# Patient Record
Sex: FEMALE | Race: WHITE | NOT HISPANIC OR LATINO | URBAN - METROPOLITAN AREA
[De-identification: names, ages, dates, MRNs, and addresses within clinical notes are randomized per-mention and may not be internally consistent; named-entity substitution may affect disease eponyms.]

---

## 2019-10-29 ENCOUNTER — OUTPATIENT (OUTPATIENT)
Dept: OUTPATIENT SERVICES | Facility: HOSPITAL | Age: 79
LOS: 1 days | Discharge: HOME | End: 2019-10-29
Payer: MEDICARE

## 2019-10-29 VITALS
TEMPERATURE: 98 F | SYSTOLIC BLOOD PRESSURE: 138 MMHG | HEIGHT: 67 IN | WEIGHT: 156.75 LBS | DIASTOLIC BLOOD PRESSURE: 72 MMHG | OXYGEN SATURATION: 97 % | RESPIRATION RATE: 16 BRPM | HEART RATE: 85 BPM

## 2019-10-29 DIAGNOSIS — M16.12 UNILATERAL PRIMARY OSTEOARTHRITIS, LEFT HIP: ICD-10-CM

## 2019-10-29 DIAGNOSIS — E78.5 HYPERLIPIDEMIA, UNSPECIFIED: ICD-10-CM

## 2019-10-29 DIAGNOSIS — Z98.890 OTHER SPECIFIED POSTPROCEDURAL STATES: Chronic | ICD-10-CM

## 2019-10-29 DIAGNOSIS — Z01.818 ENCOUNTER FOR OTHER PREPROCEDURAL EXAMINATION: ICD-10-CM

## 2019-10-29 DIAGNOSIS — Z90.49 ACQUIRED ABSENCE OF OTHER SPECIFIED PARTS OF DIGESTIVE TRACT: Chronic | ICD-10-CM

## 2019-10-29 DIAGNOSIS — I10 ESSENTIAL (PRIMARY) HYPERTENSION: ICD-10-CM

## 2019-10-29 DIAGNOSIS — Z98.891 HISTORY OF UTERINE SCAR FROM PREVIOUS SURGERY: Chronic | ICD-10-CM

## 2019-10-29 LAB
ANION GAP SERPL CALC-SCNC: 5 MMOL/L — SIGNIFICANT CHANGE UP (ref 5–17)
APTT BLD: 27.2 SEC — LOW (ref 27.5–36.3)
BLD GP AB SCN SERPL QL: SIGNIFICANT CHANGE UP
BUN SERPL-MCNC: 16 MG/DL — SIGNIFICANT CHANGE UP (ref 7–23)
CALCIUM SERPL-MCNC: 8.9 MG/DL — SIGNIFICANT CHANGE UP (ref 8.5–10.1)
CHLORIDE SERPL-SCNC: 108 MMOL/L — SIGNIFICANT CHANGE UP (ref 96–108)
CO2 SERPL-SCNC: 28 MMOL/L — SIGNIFICANT CHANGE UP (ref 22–31)
CREAT SERPL-MCNC: 0.61 MG/DL — SIGNIFICANT CHANGE UP (ref 0.5–1.3)
GLUCOSE SERPL-MCNC: 137 MG/DL — HIGH (ref 70–99)
HBA1C BLD-MCNC: 6 % — HIGH (ref 4–5.6)
HCT VFR BLD CALC: 42.9 % — SIGNIFICANT CHANGE UP (ref 34.5–45)
HGB BLD-MCNC: 14 G/DL — SIGNIFICANT CHANGE UP (ref 11.5–15.5)
INR BLD: 0.97 RATIO — SIGNIFICANT CHANGE UP (ref 0.88–1.16)
MCHC RBC-ENTMCNC: 28.8 PG — SIGNIFICANT CHANGE UP (ref 27–34)
MCHC RBC-ENTMCNC: 32.6 GM/DL — SIGNIFICANT CHANGE UP (ref 32–36)
MCV RBC AUTO: 88.3 FL — SIGNIFICANT CHANGE UP (ref 80–100)
MRSA PCR RESULT.: SIGNIFICANT CHANGE UP
NRBC # BLD: 0 /100 WBCS — SIGNIFICANT CHANGE UP (ref 0–0)
PLATELET # BLD AUTO: 319 K/UL — SIGNIFICANT CHANGE UP (ref 150–400)
POTASSIUM SERPL-MCNC: 3.9 MMOL/L — SIGNIFICANT CHANGE UP (ref 3.5–5.3)
POTASSIUM SERPL-SCNC: 3.9 MMOL/L — SIGNIFICANT CHANGE UP (ref 3.5–5.3)
PROTHROM AB SERPL-ACNC: 10.9 SEC — SIGNIFICANT CHANGE UP (ref 10–12.9)
RBC # BLD: 4.86 M/UL — SIGNIFICANT CHANGE UP (ref 3.8–5.2)
RBC # FLD: 12.9 % — SIGNIFICANT CHANGE UP (ref 10.3–14.5)
S AUREUS DNA NOSE QL NAA+PROBE: DETECTED
SODIUM SERPL-SCNC: 141 MMOL/L — SIGNIFICANT CHANGE UP (ref 135–145)
WBC # BLD: 10.36 K/UL — SIGNIFICANT CHANGE UP (ref 3.8–10.5)
WBC # FLD AUTO: 10.36 K/UL — SIGNIFICANT CHANGE UP (ref 3.8–10.5)

## 2019-10-29 PROCEDURE — 93010 ELECTROCARDIOGRAM REPORT: CPT

## 2019-10-29 RX ORDER — ATORVASTATIN CALCIUM 80 MG/1
1 TABLET, FILM COATED ORAL
Qty: 0 | Refills: 0 | DISCHARGE

## 2019-10-29 RX ORDER — LOSARTAN POTASSIUM 100 MG/1
1 TABLET, FILM COATED ORAL
Qty: 0 | Refills: 0 | DISCHARGE

## 2019-10-29 RX ORDER — SODIUM CHLORIDE 9 MG/ML
3 INJECTION INTRAMUSCULAR; INTRAVENOUS; SUBCUTANEOUS EVERY 8 HOURS
Refills: 0 | Status: DISCONTINUED | OUTPATIENT
Start: 2019-11-11 | End: 2019-11-12

## 2019-10-29 NOTE — OCCUPATIONAL THERAPY INITIAL EVALUATION ADULT - RANGE OF MOTION EXAMINATION, LOWER EXTREMITY
ROM is limited in right hip due to pain./Left LE Active ROM was WNL  (within normal limits)/Right LE Passive ROM was WFL  (within functional limits)/Left LE Passive ROM was WNL (within normal limits)/Right LE Active Assistive ROM was WFL  (within functional limits)

## 2019-10-29 NOTE — OCCUPATIONAL THERAPY INITIAL EVALUATION ADULT - LIVES WITH, PROFILE
spouse/in a private house with 5 entry steps , equipped with close bilateral  rails. Once inside, pt has to negotiate a flight of stairs  with close, bilateral hand rails to access the bedroom and bathroom. The bathroom has a  walk in shower , fixed shower head and comfort height toilet. in a private house with 5 entry steps, equipped with close bilateral rails. Once inside, pt has to negotiate a flight of stairs with close, bilateral hand rails to access the bedroom and bathroom. The bathroom has a walk in shower, fixed shower head and comfort height toilet./spouse

## 2019-10-29 NOTE — H&P PST ADULT - NSICDXPASTSURGICALHX_GEN_ALL_CORE_FT
PAST SURGICAL HISTORY:  H/O bilateral breast reduction surgery     H/O hand surgery left    H/O laminectomy     History of      History of tonsillectomy and adenoidectomy     S/P appendectomy

## 2019-10-29 NOTE — OCCUPATIONAL THERAPY INITIAL EVALUATION ADULT - GENERAL OBSERVATIONS, REHAB EVAL
Chart reviewed. Patient encountered seated in chair in [facility] waiting area with NAD. Patient underwent occupational therapy pre-operative consultation to determine current functional ADL limitations in order to provide the right equipment for patient to perform functional ADL post operation.

## 2019-10-29 NOTE — OCCUPATIONAL THERAPY INITIAL EVALUATION ADULT - PERTINENT HX OF CURRENT PROBLEM, REHAB EVAL
Pt is slated for elective surgery for right THR at a later date with MD Hayward due to OA, chronic pain and DJD. Pt has a history of multiple comorbidities. Pt denied any falls in the past 3-6 months

## 2019-10-29 NOTE — H&P PST ADULT - HISTORY OF PRESENT ILLNESS
Looking forward to returning to gardening 79F pmh htn, hl c/o right hip pain2/2 unilateral primary osteoarthritis here for PST for scheduled Right total hip replacement  Looking forward to returning to gardening

## 2019-10-29 NOTE — H&P PST ADULT - NSICDXPROBLEM_GEN_ALL_CORE_FT
PROBLEM DIAGNOSES  Problem: Unilateral primary osteoarthritis, left hip  Assessment and Plan: Right total hip replacement  Pre-op instructions given by RN, patient verbalized understanding  Chlorhexidine wash instructions given   Ensure clear given  Pending: Medical Clearance    Problem: HLD (hyperlipidemia)  Assessment and Plan: Continue current regimen and medications.     Problem: HTN (hypertension)  Assessment and Plan: Continue current regimen and medications.

## 2019-10-29 NOTE — PHYSICAL THERAPY INITIAL EVALUATION ADULT - ADDITIONAL COMMENTS
no change in bowel habits/no vomiting/no diarrhea/no constipation There are 5 steps, c merry rails, close and able to be reached simultaneously,  at the entry of the house and one flight of steps, c merry rails, close and able to be reached simultaneously, to negotiate at home.  Pt has a walk in shower c fixed shower head and comfort toilet seat in BR. Pt is not sure if 3-1 commode will fit in BR. However, pt will use if as bedside commode. Pt is R handed and drives. R hip pain ranges from 0 to 8/10 and it is unpredictable and c sharp pain. Pt claimed no pain taoday. Pt takes Tylenol for pain but not helping. Spouse will be available to assist pt in post -op care upon discharge home.

## 2019-10-29 NOTE — H&P PST ADULT - ASSESSMENT
79F pmh htn, hl c/o right hip pain2/2 unilateral primary osteoarthritis here for PST for scheduled Right total hip replacement  CAPRINI SCORE    AGE RELATED RISK FACTORS                                                       MOBILITY RELATED FACTORS  [ ] Age 41-60 years                                            (1 Point)                  [ ] Bed rest                                                        (1 Point)  [ ] Age: 61-74 years                                           (2 Points)                [ ] Plaster cast                                                   (2 Points)  [x ] Age= 75 years                                              (3 Points)                 [ ] Bed bound for more than 72 hours                   (2 Points)    DISEASE RELATED RISK FACTORS                                               GENDER SPECIFIC FACTORS  [ x] Edema in the lower extremities                       (1 Point)                  [ ] Pregnancy                                                     (1 Point)  [ ] Varicose veins                                               (1 Point)                  [ ] Post-partum < 6 weeks                                   (1 Point)             [x ] BMI > 25 Kg/m2                                            (1 Point)                  [ ] Hormonal therapy  or oral contraception            (1 Point)                 [ ] Sepsis (in the previous month)                        (1 Point)                  [ ] History of pregnancy complications  [ ] Pneumonia or serious lung disease                                               [ ] Unexplained or recurrent                       (1 Point)           (in the previous month)                               (1 Point)  [ ] Abnormal pulmonary function test                     (1 Point)                 SURGERY RELATED RISK FACTORS  [ ] Acute myocardial infarction                              (1 Point)                 [ ]  Section                                            (1 Point)  [ ] Congestive heart failure (in the previous month)  (1 Point)                 [ ] Minor surgery                                                 (1 Point)   [ ] Inflammatory bowel disease                             (1 Point)                 [ ] Arthroscopic surgery                                        (2 Points)  [ ] Central venous access                                    (2 Points)                [ ] General surgery lasting more than 45 minutes   (2 Points)       [ ] Stroke (in the previous month)                          (5 Points)               [x ] Elective arthroplasty                                        (5 Points)                                                                                                                                               HEMATOLOGY RELATED FACTORS                                                 TRAUMA RELATED RISK FACTORS  [ ] Prior episodes of VTE                                     (3 Points)                 [ ] Fracture of the hip, pelvis, or leg                       (5 Points)  [ ] Positive family history for VTE                         (3 Points)                 [ ] Acute spinal cord injury (in the previous month)  (5 Points)  [ ] Prothrombin 98561 A                                      (3 Points)                 [ ] Paralysis  (less than 1 month)                          (5 Points)  [ ] Factor V Leiden                                             (3 Points)                 [ ] Multiple Trauma within 1 month                         (5 Points)  [ ] Lupus anticoagulants                                     (3 Points)                                                           [ ] Anticardiolipin antibodies                                (3 Points)                                                       [ ] High homocysteine in the blood                      (3 Points)                                             [ ] Other congenital or acquired thrombophilia       (3 Points)                                                [ ] Heparin induced thrombocytopenia                  (3 Points)                                          Total Score [  10        ]

## 2019-10-29 NOTE — H&P PST ADULT - NSANTHOSAYNRD_GEN_A_CORE
No. CARLENE screening performed.  STOP BANG Legend: 0-2 = LOW Risk; 3-4 = INTERMEDIATE Risk; 5-8 = HIGH Risk

## 2019-10-29 NOTE — OCCUPATIONAL THERAPY INITIAL EVALUATION ADULT - ADDITIONAL COMMENTS
Pt is functioning in her roles, self sufficient, driving & ambulating independently in the community without any assistive devices. Pt is right hand dominant and wears glasses for reading. Pt c/o 7/10 pain in her right hip ; this intensifies with changes in the weather, prolonged sitting, standing walking and it impacts ADL management. Pt takes Tylenol PRN for pain relief. Pt scores 90% of patient specific scale .

## 2019-10-29 NOTE — PHYSICAL THERAPY INITIAL EVALUATION ADULT - CRITERIA FOR SKILLED THERAPEUTIC INTERVENTIONS
therapy frequency/anticipated equipment needs at discharge/anticipated discharge recommendation/impairments found/functional limitations in following categories/risk reduction/prevention/rehab potential

## 2019-10-29 NOTE — OCCUPATIONAL THERAPY INITIAL EVALUATION ADULT - COORDINATION ASSESSED, REHAB EVAL
finger to nose/heel to shin/intact in BUE; diminished in BLE intact in BUE; diminished in BLE/finger to nose/heel to shin

## 2019-10-29 NOTE — OCCUPATIONAL THERAPY INITIAL EVALUATION ADULT - SOCIAL CONCERNS
Pt voiced concerns about her recovery at home. Pt has her spouse to assist her after discharge home post-operatively./Complex psychosocial needs/coping issues

## 2019-10-30 RX ORDER — MUPIROCIN 20 MG/G
1 OINTMENT TOPICAL
Qty: 1 | Refills: 0
Start: 2019-10-30

## 2019-11-06 DIAGNOSIS — M16.11 UNILATERAL PRIMARY OSTEOARTHRITIS, RIGHT HIP: ICD-10-CM

## 2019-11-10 ENCOUNTER — TRANSCRIPTION ENCOUNTER (OUTPATIENT)
Age: 79
End: 2019-11-10

## 2019-11-11 ENCOUNTER — TRANSCRIPTION ENCOUNTER (OUTPATIENT)
Age: 79
End: 2019-11-11

## 2019-11-11 ENCOUNTER — INPATIENT (INPATIENT)
Facility: HOSPITAL | Age: 79
LOS: 0 days | Discharge: NURSING, REHAB OR LONG-TERM CARE | End: 2019-11-12
Attending: ORTHOPAEDIC SURGERY | Admitting: ORTHOPAEDIC SURGERY
Payer: MEDICARE

## 2019-11-11 ENCOUNTER — RESULT REVIEW (OUTPATIENT)
Age: 79
End: 2019-11-11

## 2019-11-11 VITALS
WEIGHT: 156.53 LBS | HEART RATE: 91 BPM | RESPIRATION RATE: 13 BRPM | TEMPERATURE: 97 F | SYSTOLIC BLOOD PRESSURE: 139 MMHG | HEIGHT: 67 IN | OXYGEN SATURATION: 98 % | DIASTOLIC BLOOD PRESSURE: 64 MMHG

## 2019-11-11 DIAGNOSIS — Z98.890 OTHER SPECIFIED POSTPROCEDURAL STATES: Chronic | ICD-10-CM

## 2019-11-11 DIAGNOSIS — Z98.891 HISTORY OF UTERINE SCAR FROM PREVIOUS SURGERY: Chronic | ICD-10-CM

## 2019-11-11 DIAGNOSIS — Z90.49 ACQUIRED ABSENCE OF OTHER SPECIFIED PARTS OF DIGESTIVE TRACT: Chronic | ICD-10-CM

## 2019-11-11 LAB
BLD GP AB SCN SERPL QL: SIGNIFICANT CHANGE UP
GLUCOSE BLDC GLUCOMTR-MCNC: 157 MG/DL — HIGH (ref 70–99)
HCT VFR BLD CALC: 36.8 % — SIGNIFICANT CHANGE UP (ref 34.5–45)
HGB BLD-MCNC: 12 G/DL — SIGNIFICANT CHANGE UP (ref 11.5–15.5)
MCHC RBC-ENTMCNC: 29.1 PG — SIGNIFICANT CHANGE UP (ref 27–34)
MCHC RBC-ENTMCNC: 32.6 GM/DL — SIGNIFICANT CHANGE UP (ref 32–36)
MCV RBC AUTO: 89.3 FL — SIGNIFICANT CHANGE UP (ref 80–100)
NRBC # BLD: 0 /100 WBCS — SIGNIFICANT CHANGE UP (ref 0–0)
PLATELET # BLD AUTO: 295 K/UL — SIGNIFICANT CHANGE UP (ref 150–400)
RBC # BLD: 4.12 M/UL — SIGNIFICANT CHANGE UP (ref 3.8–5.2)
RBC # FLD: 12.8 % — SIGNIFICANT CHANGE UP (ref 10.3–14.5)
WBC # BLD: 12.91 K/UL — HIGH (ref 3.8–10.5)
WBC # FLD AUTO: 12.91 K/UL — HIGH (ref 3.8–10.5)

## 2019-11-11 PROCEDURE — 88311 DECALCIFY TISSUE: CPT | Mod: 26

## 2019-11-11 PROCEDURE — 72170 X-RAY EXAM OF PELVIS: CPT | Mod: 26

## 2019-11-11 PROCEDURE — 88305 TISSUE EXAM BY PATHOLOGIST: CPT | Mod: 26

## 2019-11-11 RX ORDER — ACETAMINOPHEN 500 MG
650 TABLET ORAL ONCE
Refills: 0 | Status: COMPLETED | OUTPATIENT
Start: 2019-11-11 | End: 2019-11-11

## 2019-11-11 RX ORDER — MORPHINE SULFATE 50 MG/1
4 CAPSULE, EXTENDED RELEASE ORAL ONCE
Refills: 0 | Status: DISCONTINUED | OUTPATIENT
Start: 2019-11-11 | End: 2019-11-11

## 2019-11-11 RX ORDER — ASPIRIN/CALCIUM CARB/MAGNESIUM 324 MG
325 TABLET ORAL
Refills: 0 | Status: DISCONTINUED | OUTPATIENT
Start: 2019-11-12 | End: 2019-11-12

## 2019-11-11 RX ORDER — CEFAZOLIN SODIUM 1 G
2000 VIAL (EA) INJECTION EVERY 8 HOURS
Refills: 0 | Status: COMPLETED | OUTPATIENT
Start: 2019-11-11 | End: 2019-11-12

## 2019-11-11 RX ORDER — DEXAMETHASONE 0.5 MG/5ML
10 ELIXIR ORAL ONCE
Refills: 0 | Status: COMPLETED | OUTPATIENT
Start: 2019-11-12 | End: 2019-11-12

## 2019-11-11 RX ORDER — CELECOXIB 200 MG/1
200 CAPSULE ORAL
Refills: 0 | Status: DISCONTINUED | OUTPATIENT
Start: 2019-11-12 | End: 2019-11-12

## 2019-11-11 RX ORDER — ONDANSETRON 8 MG/1
4 TABLET, FILM COATED ORAL EVERY 6 HOURS
Refills: 0 | Status: DISCONTINUED | OUTPATIENT
Start: 2019-11-11 | End: 2019-11-12

## 2019-11-11 RX ORDER — KETOROLAC TROMETHAMINE 30 MG/ML
30 SYRINGE (ML) INJECTION ONCE
Refills: 0 | Status: DISCONTINUED | OUTPATIENT
Start: 2019-11-11 | End: 2019-11-11

## 2019-11-11 RX ORDER — OXYCODONE HYDROCHLORIDE 5 MG/1
5 TABLET ORAL EVERY 4 HOURS
Refills: 0 | Status: DISCONTINUED | OUTPATIENT
Start: 2019-11-11 | End: 2019-11-12

## 2019-11-11 RX ORDER — CELECOXIB 200 MG/1
200 CAPSULE ORAL ONCE
Refills: 0 | Status: COMPLETED | OUTPATIENT
Start: 2019-11-11 | End: 2019-11-11

## 2019-11-11 RX ORDER — MAGNESIUM HYDROXIDE 400 MG/1
30 TABLET, CHEWABLE ORAL DAILY
Refills: 0 | Status: DISCONTINUED | OUTPATIENT
Start: 2019-11-11 | End: 2019-11-12

## 2019-11-11 RX ORDER — POLYETHYLENE GLYCOL 3350 17 G/17G
17 POWDER, FOR SOLUTION ORAL DAILY
Refills: 0 | Status: DISCONTINUED | OUTPATIENT
Start: 2019-11-11 | End: 2019-11-12

## 2019-11-11 RX ORDER — SODIUM CHLORIDE 9 MG/ML
1000 INJECTION, SOLUTION INTRAVENOUS
Refills: 0 | Status: DISCONTINUED | OUTPATIENT
Start: 2019-11-11 | End: 2019-11-12

## 2019-11-11 RX ORDER — OXYCODONE HYDROCHLORIDE 5 MG/1
10 TABLET ORAL EVERY 4 HOURS
Refills: 0 | Status: DISCONTINUED | OUTPATIENT
Start: 2019-11-11 | End: 2019-11-12

## 2019-11-11 RX ORDER — ASCORBIC ACID 60 MG
500 TABLET,CHEWABLE ORAL
Refills: 0 | Status: DISCONTINUED | OUTPATIENT
Start: 2019-11-11 | End: 2019-11-12

## 2019-11-11 RX ORDER — ACETAMINOPHEN 500 MG
650 TABLET ORAL ONCE
Refills: 0 | Status: DISCONTINUED | OUTPATIENT
Start: 2019-11-11 | End: 2019-11-12

## 2019-11-11 RX ORDER — ONDANSETRON 8 MG/1
4 TABLET, FILM COATED ORAL ONCE
Refills: 0 | Status: DISCONTINUED | OUTPATIENT
Start: 2019-11-11 | End: 2019-11-11

## 2019-11-11 RX ORDER — ATORVASTATIN CALCIUM 80 MG/1
20 TABLET, FILM COATED ORAL AT BEDTIME
Refills: 0 | Status: DISCONTINUED | OUTPATIENT
Start: 2019-11-11 | End: 2019-11-12

## 2019-11-11 RX ORDER — ACETAMINOPHEN 500 MG
650 TABLET ORAL EVERY 6 HOURS
Refills: 0 | Status: DISCONTINUED | OUTPATIENT
Start: 2019-11-11 | End: 2019-11-12

## 2019-11-11 RX ORDER — SODIUM CHLORIDE 9 MG/ML
1000 INJECTION, SOLUTION INTRAVENOUS
Refills: 0 | Status: DISCONTINUED | OUTPATIENT
Start: 2019-11-11 | End: 2019-11-11

## 2019-11-11 RX ORDER — HYDROMORPHONE HYDROCHLORIDE 2 MG/ML
0.5 INJECTION INTRAMUSCULAR; INTRAVENOUS; SUBCUTANEOUS
Refills: 0 | Status: DISCONTINUED | OUTPATIENT
Start: 2019-11-11 | End: 2019-11-11

## 2019-11-11 RX ORDER — CELECOXIB 200 MG/1
200 CAPSULE ORAL ONCE
Refills: 0 | Status: DISCONTINUED | OUTPATIENT
Start: 2019-11-11 | End: 2019-11-12

## 2019-11-11 RX ORDER — TRANEXAMIC ACID 100 MG/ML
1000 INJECTION, SOLUTION INTRAVENOUS ONCE
Refills: 0 | Status: COMPLETED | OUTPATIENT
Start: 2019-11-11 | End: 2019-11-11

## 2019-11-11 RX ORDER — SENNA PLUS 8.6 MG/1
2 TABLET ORAL AT BEDTIME
Refills: 0 | Status: DISCONTINUED | OUTPATIENT
Start: 2019-11-11 | End: 2019-11-12

## 2019-11-11 RX ORDER — LOSARTAN POTASSIUM 100 MG/1
100 TABLET, FILM COATED ORAL DAILY
Refills: 0 | Status: DISCONTINUED | OUTPATIENT
Start: 2019-11-11 | End: 2019-11-12

## 2019-11-11 RX ORDER — HYDROMORPHONE HYDROCHLORIDE 2 MG/ML
0.25 INJECTION INTRAMUSCULAR; INTRAVENOUS; SUBCUTANEOUS
Refills: 0 | Status: DISCONTINUED | OUTPATIENT
Start: 2019-11-11 | End: 2019-11-11

## 2019-11-11 RX ORDER — PANTOPRAZOLE SODIUM 20 MG/1
40 TABLET, DELAYED RELEASE ORAL
Refills: 0 | Status: DISCONTINUED | OUTPATIENT
Start: 2019-11-11 | End: 2019-11-12

## 2019-11-11 RX ADMIN — Medication 100 MILLIGRAM(S): at 20:36

## 2019-11-11 RX ADMIN — HYDROMORPHONE HYDROCHLORIDE 0.25 MILLIGRAM(S): 2 INJECTION INTRAMUSCULAR; INTRAVENOUS; SUBCUTANEOUS at 15:35

## 2019-11-11 RX ADMIN — SODIUM CHLORIDE 3 MILLILITER(S): 9 INJECTION INTRAMUSCULAR; INTRAVENOUS; SUBCUTANEOUS at 22:52

## 2019-11-11 RX ADMIN — HYDROMORPHONE HYDROCHLORIDE 0.5 MILLIGRAM(S): 2 INJECTION INTRAMUSCULAR; INTRAVENOUS; SUBCUTANEOUS at 15:20

## 2019-11-11 RX ADMIN — OXYCODONE HYDROCHLORIDE 10 MILLIGRAM(S): 5 TABLET ORAL at 19:45

## 2019-11-11 RX ADMIN — Medication 30 MILLIGRAM(S): at 17:49

## 2019-11-11 RX ADMIN — Medication 30 MILLIGRAM(S): at 17:14

## 2019-11-11 RX ADMIN — TRANEXAMIC ACID 1000 MILLIGRAM(S): 100 INJECTION, SOLUTION INTRAVENOUS at 14:57

## 2019-11-11 RX ADMIN — Medication 650 MILLIGRAM(S): at 17:50

## 2019-11-11 RX ADMIN — MORPHINE SULFATE 4 MILLIGRAM(S): 50 CAPSULE, EXTENDED RELEASE ORAL at 17:35

## 2019-11-11 RX ADMIN — SODIUM CHLORIDE 100 MILLILITER(S): 9 INJECTION, SOLUTION INTRAVENOUS at 22:52

## 2019-11-11 RX ADMIN — Medication 650 MILLIGRAM(S): at 18:50

## 2019-11-11 RX ADMIN — Medication 500 MILLIGRAM(S): at 17:50

## 2019-11-11 RX ADMIN — SODIUM CHLORIDE 110 MILLILITER(S): 9 INJECTION, SOLUTION INTRAVENOUS at 14:57

## 2019-11-11 RX ADMIN — OXYCODONE HYDROCHLORIDE 10 MILLIGRAM(S): 5 TABLET ORAL at 18:45

## 2019-11-11 RX ADMIN — MORPHINE SULFATE 4 MILLIGRAM(S): 50 CAPSULE, EXTENDED RELEASE ORAL at 17:10

## 2019-11-11 RX ADMIN — CELECOXIB 200 MILLIGRAM(S): 200 CAPSULE ORAL at 10:44

## 2019-11-11 RX ADMIN — HYDROMORPHONE HYDROCHLORIDE 0.5 MILLIGRAM(S): 2 INJECTION INTRAMUSCULAR; INTRAVENOUS; SUBCUTANEOUS at 14:54

## 2019-11-11 RX ADMIN — Medication 650 MILLIGRAM(S): at 10:44

## 2019-11-11 RX ADMIN — HYDROMORPHONE HYDROCHLORIDE 0.25 MILLIGRAM(S): 2 INJECTION INTRAMUSCULAR; INTRAVENOUS; SUBCUTANEOUS at 15:50

## 2019-11-11 RX ADMIN — ATORVASTATIN CALCIUM 20 MILLIGRAM(S): 80 TABLET, FILM COATED ORAL at 22:52

## 2019-11-11 NOTE — DISCHARGE NOTE PROVIDER - NSDCMRMEDTOKEN_GEN_ALL_CORE_FT
aspirin 81 mg oral tablet: 1 tab(s) orally once a day  atorvastatin: 20 milligram(s) orally once a day  losartan: 100 milligram(s) orally once a day  mupirocin 2% topical ointment: Apply topically to affected area 2 times a day MDD:2 nasally ascorbic acid 500 mg oral tablet: 1 tab(s) orally 2 times a day  aspirin 325 mg oral delayed release tablet: 1 tab(s) orally 2 times a day for DVT Prophylaxis   atorvastatin: 20 milligram(s) orally once a day  celecoxib 200 mg oral capsule: 1 cap(s) orally 2 times a day  losartan: 100 milligram(s) orally once a day  Multiple Vitamins oral tablet: 1 tab(s) orally once a day  oxyCODONE 10 mg oral tablet: 1 tab(s) orally every 4 hours, As needed, Moderate Pain (4 - 6)  oxyCODONE 5 mg oral tablet: 1 tab(s) orally every 4 hours, As needed, Mild Pain (1 - 3)  pantoprazole 40 mg oral delayed release tablet: 1 tab(s) orally once a day (before a meal)  senna oral tablet: 2 tab(s) orally once a day (at bedtime), As needed, Constipation

## 2019-11-11 NOTE — PHYSICAL THERAPY INITIAL EVALUATION ADULT - TRANSFER SAFETY CONCERNS NOTED: BED/CHAIR, REHAB EVAL
stepping too close to front of assistive device/decreased safety awareness/decreased sequencing ability/decreased balance during turns/decreased step length/losing balance/decreased weight-shifting ability

## 2019-11-11 NOTE — PHYSICAL THERAPY INITIAL EVALUATION ADULT - GAIT TRAINING, PT EVAL
Pt will be able to ambulate 350 feet using [RW] and maintaining WB precautions  independently in 2 to 3 weeks

## 2019-11-11 NOTE — PHYSICAL THERAPY INITIAL EVALUATION ADULT - TRANSFER TRAINING, PT EVAL
Pt will be able to perform sit to stand, stand pivot transfer using [RW] and maintaining WB precautions  independently in 2 weeks

## 2019-11-11 NOTE — PHYSICAL THERAPY INITIAL EVALUATION ADULT - ADDITIONAL COMMENTS
Verified post operatively,There are 5 steps, c merry rails, close and able to be reached simultaneously,  at the entry of the house and one flight of steps, c merry rails, close and able to be reached simultaneously, to negotiate at home.  Pt has a walk in shower c fixed shower head and comfort toilet seat in BR. Pt is not sure if 3-1 commode will fit in BR. However, pt will use if as bedside commode. Pt is R handed and drives. R hip pain ranges from 0 to 8/10 and it is unpredictable and c sharp pain. Pt claimed no pain today. Pt takes Tylenol for pain but not helping. Spouse will be available to assist pt in post -op care upon discharge home.

## 2019-11-11 NOTE — PHYSICAL THERAPY INITIAL EVALUATION ADULT - LEVEL OF INDEPENDENCE: BED TO CHAIR, REHAB EVAL
Psychiatry Outpatient Progress Note        History:     Patient report: Patient missed last appt, had unexpected pelvic surgery, fairly minor, removing endometrial polyps, but was not feeling well. Continues to have significant racing thoughts and mood instability, although mood overall and chronic SI are fairly improved with increased Zoloft, she is interested in trying trileptal as we have discussed previously.   Review of Systems: negative    Medications:  Current Outpatient Prescriptions   Medication Sig   • sertraline (ZOLOFT) 100 MG tablet Take 2 tablets by mouth daily.   • OXcarbazepine (TRILEPTAL) 150 MG tablet Take 1 tablet by mouth 2 times daily.   • clonazePAM (KLONOPIN) 1 MG tablet Take 0.5 tablets by mouth 3 times daily.   • zolpidem (AMBIEN) 10 MG tablet Take 1 tablet by mouth nightly as needed for Sleep. Fill on or after 6/14/18   • levothyroxine (SYNTHROID, LEVOTHROID) 137 MCG tablet Take 1 tablet by mouth Monday - Saturday.   • amLODIPine (NORVASC) 5 MG tablet Take 1 tablet by mouth daily.   • hydrochlorothiazide (HYDRODIURIL) 12.5 MG tablet Take 2 tablets by mouth daily.   • fluconazole (DIFLUCAN) 150 MG tablet Take one pill now and repeat in 3 days if needed   • lidocaine viscous (XYLOCAINE) 2 % solution Take 15 mLs by mouth every 3 hours as needed for Pain.   • lisinopril-hydroCHLOROthiazide (PRINZIDE,ZESTORETIC) 20-12.5 MG per tablet Take 2 tablets by mouth daily.   • ibuprofen (MOTRIN) 600 MG tablet Take 1 tablet by mouth every 6 hours as needed (cramping).   • Vitamin D, Ergocalciferol, 49730 units capsule Take 1 capsule by mouth once a week.   • fluticasone (FLONASE) 50 MCG/ACT nasal spray Spray 2 sprays in each nostril daily.     No current facility-administered medications for this visit.          Laboratory Tests or other studies:     none today    Mental Staus Exam: General Appearance: Well-nourished, Grooming, appropriate and neat, Attitude: Cooperative and Pleasant, Speech Rate: normal, 
Clarity, clear, Makes Self understood: Understood- can express ideas and wants, Ability to Understand Others: Understands, Volume: normal, Latency: normal, Mood:  \"ok\", Affect:  anxious and stable, Psychomotor: normal, Associations: intact, thought Process: linear, logical and goal directed, Thought content: unremarkable, Perceptual disorders/hallucinations: none reported or observed, Level of consciousness: alert, Orientation: oriented to person, place, time and general circumstances, Attention/Concentration: ability to maintain attention, Fund of knowledge: average, Memory: no apparent impairments in short term memory and no apparent impairments in long term memory, Insight:  fair, Judgment:  fair, Suicidal thoughts:  Denies, Gait/Station: Steady and well paced      Medical Decision Making    Diagnoses:    F33.1 Depression, major, recurrent, moderate (CMS/HCC)  (primary encounter diagnosis)  F60.3 Borderline personality disorder  F41.1 KWAME (generalized anxiety disorder)  F90.2 Attention deficit hyperactivity disorder (ADHD), combined type      Narrative assessment and plan:  Patient with some improvement. Continued racing thoughts, mood instability and chronic intermittent SI. She is restarting DBT next week which is a great idea. She is very sensitive to side effects and also highly somatically preoccupied. This makes it difficult trying new medications and she always has to think about it for a while. She will go ahead and try Trileptal for mood stabilization, beginning at a low dose. No other changes today.      Risk Assessment: no indication of acutely increased risk of harm to self or others above personal baseline, patient's baseline risk of harm to self is moderate and patient's baseline risk of harm to others is low    Follow up: 4 weeks                  
tea
minimum assist (75% patients effort)

## 2019-11-11 NOTE — OCCUPATIONAL THERAPY INITIAL EVALUATION ADULT - PLANNED THERAPY INTERVENTIONS, OT EVAL
ADL retraining/balance training/ROM/transfer training/strengthening/stretching/bed mobility training

## 2019-11-11 NOTE — OCCUPATIONAL THERAPY INITIAL EVALUATION ADULT - TRANSFER SAFETY CONCERNS NOTED: TOILET, REHAB EVAL
losing balance/decreased weight-shifting ability/decreased safety awareness/decreased balance during turns/decreased step length

## 2019-11-11 NOTE — PHYSICAL THERAPY INITIAL EVALUATION ADULT - STRENGTHENING, PT EVAL
Pt will improve muscle strength in all extremities to WFL in 1 to 2 weeks to perform Gait & ADL independently

## 2019-11-11 NOTE — PHYSICAL THERAPY INITIAL EVALUATION ADULT - TRANSFER SAFETY CONCERNS NOTED: SIT/STAND, REHAB EVAL
stepping too close to front of assistive device/decreased balance during turns/decreased safety awareness/decreased sequencing ability/decreased weight-shifting ability

## 2019-11-11 NOTE — OCCUPATIONAL THERAPY INITIAL EVALUATION ADULT - ADDITIONAL COMMENTS
Pre op assessment confirmed- pt lives with spouse in a private house with 5 entry steps, equipped with close bilateral rails. Once inside, pt has to negotiate a flight of stairs with close, bilateral hand rails to access the bedroom and bathroom. The bathroom has a walk in shower, fixed shower head and comfort height toilet. Pt has her spouse to assist her after discharge home post-operatively.

## 2019-11-11 NOTE — DISCHARGE NOTE PROVIDER - CARE PROVIDER_API CALL
Rocco Hayward)  Orthopaedic Surgery  [address] [place], [place] [address]  Phone: [phone]  Fax: [phone]  Follow Up Time:

## 2019-11-11 NOTE — CONSULT NOTE ADULT - SUBJECTIVE AND OBJECTIVE BOX
LILLIAN DENIS is a 79y Female s/p RIGHT TOTAL HIP REPLACEMENT  by Dr. Hayward on 19. complains of postop pain; patient tolerated surgery well.     PMH: w/ h/o HLD (hyperlipidemia)  HTN (hypertension)    ROS: no fevers, chills, headache, dizziness, lightheadedness, chest pain, palpitations, shortness of breath, cough, phlegm, wheezing, abdominal pain, nausea, vomiting, diarrhea, constipation or urinary symptoms     PSH:  H/O bilateral breast reduction surgery  H/O hand surgery  History of tonsillectomy and adenoidectomy  H/O laminectomy  S/P appendectomy  History of     SH: does not smoke or drink at this time    No Known Allergies    MEDS:  acetaminophen   Tablet .. 650 milliGRAM(s) Oral once  acetaminophen   Tablet .. 650 milliGRAM(s) Oral every 6 hours  aluminum hydroxide/magnesium hydroxide/simethicone Suspension 30 milliLiter(s) Oral four times a day PRN  ascorbic acid 500 milliGRAM(s) Oral two times a day  atorvastatin 20 milliGRAM(s) Oral at bedtime  ceFAZolin   IVPB 2000 milliGRAM(s) IV Intermittent every 8 hours  celecoxib 200 milliGRAM(s) Oral once  lactated ringers. 1000 milliLiter(s) IV Continuous <Continuous>  losartan 100 milliGRAM(s) Oral daily  magnesium hydroxide Suspension 30 milliLiter(s) Oral daily PRN  multivitamin 1 Tablet(s) Oral daily  ondansetron Injectable 4 milliGRAM(s) IV Push every 6 hours PRN  oxyCODONE    IR 5 milliGRAM(s) Oral every 4 hours PRN  oxyCODONE    IR 10 milliGRAM(s) Oral every 4 hours PRN  pantoprazole    Tablet 40 milliGRAM(s) Oral before breakfast  polyethylene glycol 3350 17 Gram(s) Oral daily  senna 2 Tablet(s) Oral at bedtime PRN  sodium chloride 0.9% lock flush 3 milliLiter(s) IV Push every 8 hours    PHYS: T(C): 37 (19 @ 18:44), Max: 37 (19 @ 18:44)  HR: 71 (19 @ 19:40) (62 - 99)  BP: 146/57 (19 @ 19:40) (116/73 - 148/83)  RR: 17 (19 @ 19:40) (13 - 21)  SpO2: 95% (19 @ 19:40) (94% - 100%)  HEENT unremarkable  neck no JVD or bruit  lungs, clear bilaterally  heart, regular rhythm, normal S1, S2, no murmurs, rubs or gallops  abdomen, soft, non tender, no organomegaly, normal bowel sounds  no cyanosis, clubbing, edema or calf tenderness  neuro, grossly normal                        12.0   12.91 )-----------( 295      ( 2019 15:36 )             36.8     Assessment and Plan: status post right total hip replacement; Hypertension; hyperlipidemia; postop leucocytosis; pre diabetes mellitus (random elevated glucose and A1c=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

## 2019-11-11 NOTE — DISCHARGE NOTE PROVIDER - NSDCFUADDINST_GEN_ALL_CORE_FT
Keep STACIE Dressing Clean, Dry and Intact. May shower with STACIE Dressing. Please do not scrub, soak, peel or pick at the STACIE dressing. No creams, lotions, or oils over dressing. May shower and let water run over dressing, no baths. Pat dry once out of shower. Dressing to be removed in 7 days. If dressing is saturated from border to border - may remove and replace with clean dry dressing.    Shower instructions for STACIE Dressing: Turn battery pack off. Twist OFF battery pack before entering shower. Once done with showering. Pat dressing dry. Reconnect and twist ON battery pack after you are dry. Then turn battery pack on.    Hip replacement precautions: Abduction pillow. Elevate the leg (while keeping hip precautions) as often as possible to help control swelling.

## 2019-11-11 NOTE — DISCHARGE NOTE PROVIDER - NSDCACTIVITY_GEN_ALL_CORE
Walking - Indoors allowed/No heavy lifting/straining/Showering allowed/Do not drive or operate machinery/Do not make important decisions/Stairs allowed/Walking - Outdoors allowed

## 2019-11-11 NOTE — DISCHARGE NOTE PROVIDER - NSDCFUADDAPPT_GEN_ALL_CORE_FT
Follow up with your surgeon in two weeks.  Call for appointment.  If you need more pain medication, call your surgeon's office.  We recommend that you call and schedule a follow up appointment with your primary care physician for repeat blood work (CBC and BMP) for post hospital discharge follow-up care.  Call your surgeon if you have increased redness/pain/drainage or fever. Return to ER for shortness of breath/calf tenderness.

## 2019-11-11 NOTE — PHYSICAL THERAPY INITIAL EVALUATION ADULT - BALANCE TRAINING, PT EVAL
Pt will improve static & dynamic standing balance to Good using [Rolling walker] maintaining WB precaution  to perform ADL, Gait independently  in 2 to 3 weeks

## 2019-11-11 NOTE — OCCUPATIONAL THERAPY INITIAL EVALUATION ADULT - TRANSFER SAFETY CONCERNS NOTED: SIT/STAND, REHAB EVAL
decreased safety awareness/decreased balance during turns/decreased step length/losing balance/decreased weight-shifting ability

## 2019-11-11 NOTE — PROGRESS NOTE ADULT - SUBJECTIVE AND OBJECTIVE BOX
79yFemale s/p L Total Hip Arthroplasty pod 0.  Pt seen and examined in NAD.  Pain currently not controlled.  Pt denies any new complaints.  Pt denies CP/SOB/N/V/D/numbness/tingling.     PE:     LLE: STACIE dressing c/d/i. +ROM ankle/toes. Calf: soft, compressible and nontender. DP/PT 2+ NVI.                           12.0   12.91 )-----------( 295      ( 11 Nov 2019 15:36 )             36.8             A/P: 79yFemale s/p L LINA pod 0.  +/- Total hip precautions  PT: WBAT   DVT ppx: SCDs, ASA, ambulation  Wound care  Isometric exercises encouraged  incentive spirometry encouraged  Discharge: planning   All the above discussed and understood by pt

## 2019-11-11 NOTE — PHYSICAL THERAPY INITIAL EVALUATION ADULT - CRITERIA FOR SKILLED THERAPEUTIC INTERVENTIONS
therapy frequency/anticipated discharge recommendation/subacute rehab, pt has DME/anticipated equipment needs at discharge/impairments found/functional limitations in following categories/risk reduction/prevention/rehab potential/predicted duration of therapy intervention

## 2019-11-11 NOTE — OCCUPATIONAL THERAPY INITIAL EVALUATION ADULT - BALANCE TRAINING, PT EVAL
English Pt will increase dynamic standing balance to normal to increase performance with ADLs in 2 weeks

## 2019-11-11 NOTE — DISCHARGE NOTE PROVIDER - HOSPITAL COURSE
79yFemale with history of OA presenting for L LINA by [facility] on 11/11/19.  Risk and benefits of surgery were explained to the patient.  The patient understood and agreed to proceed with surgery.  Patient underwent the procedure with no intraoperative complications.  Pt was brought in stable condition to the PACU.  Once stable in PACU, pt was brought to the floor.  During hospital stay pt was followed by Medicine, social work, home care, PT and OT during this admission. Pt had an uneventful hospital course. Pt is stable for discharge to [rehab/home] on POD#1. 79yFemale with history of OA presenting for L LINA by [facility] on 11/11/19.  Risk and benefits of surgery were explained to the patient.  The patient understood and agreed to proceed with surgery.  Patient underwent the procedure with no intraoperative complications.  Pt was brought in stable condition to the PACU.  Once stable in PACU, pt was brought to the floor.  During hospital stay pt was followed by Medicine, social work, home care, PT and OT during this admission. Pt had an uneventful hospital course. Pt is stable for discharge to Rehab

## 2019-11-12 ENCOUNTER — TRANSCRIPTION ENCOUNTER (OUTPATIENT)
Age: 79
End: 2019-11-12

## 2019-11-12 VITALS
SYSTOLIC BLOOD PRESSURE: 130 MMHG | DIASTOLIC BLOOD PRESSURE: 59 MMHG | HEART RATE: 84 BPM | OXYGEN SATURATION: 94 % | RESPIRATION RATE: 16 BRPM

## 2019-11-12 LAB
ANION GAP SERPL CALC-SCNC: 5 MMOL/L — SIGNIFICANT CHANGE UP (ref 5–17)
BUN SERPL-MCNC: 17 MG/DL — SIGNIFICANT CHANGE UP (ref 7–23)
CALCIUM SERPL-MCNC: 7.9 MG/DL — LOW (ref 8.5–10.1)
CHLORIDE SERPL-SCNC: 103 MMOL/L — SIGNIFICANT CHANGE UP (ref 96–108)
CO2 SERPL-SCNC: 28 MMOL/L — SIGNIFICANT CHANGE UP (ref 22–31)
CREAT SERPL-MCNC: 0.74 MG/DL — SIGNIFICANT CHANGE UP (ref 0.5–1.3)
GLUCOSE SERPL-MCNC: 118 MG/DL — HIGH (ref 70–99)
HCT VFR BLD CALC: 32.5 % — LOW (ref 34.5–45)
HGB BLD-MCNC: 10.8 G/DL — LOW (ref 11.5–15.5)
MCHC RBC-ENTMCNC: 29.8 PG — SIGNIFICANT CHANGE UP (ref 27–34)
MCHC RBC-ENTMCNC: 33.2 GM/DL — SIGNIFICANT CHANGE UP (ref 32–36)
MCV RBC AUTO: 89.5 FL — SIGNIFICANT CHANGE UP (ref 80–100)
NRBC # BLD: 0 /100 WBCS — SIGNIFICANT CHANGE UP (ref 0–0)
PLATELET # BLD AUTO: 255 K/UL — SIGNIFICANT CHANGE UP (ref 150–400)
POTASSIUM SERPL-MCNC: 4.3 MMOL/L — SIGNIFICANT CHANGE UP (ref 3.5–5.3)
POTASSIUM SERPL-SCNC: 4.3 MMOL/L — SIGNIFICANT CHANGE UP (ref 3.5–5.3)
RBC # BLD: 3.63 M/UL — LOW (ref 3.8–5.2)
RBC # FLD: 12.8 % — SIGNIFICANT CHANGE UP (ref 10.3–14.5)
SODIUM SERPL-SCNC: 136 MMOL/L — SIGNIFICANT CHANGE UP (ref 135–145)
WBC # BLD: 14.87 K/UL — HIGH (ref 3.8–10.5)
WBC # FLD AUTO: 14.87 K/UL — HIGH (ref 3.8–10.5)

## 2019-11-12 RX ORDER — ASPIRIN/CALCIUM CARB/MAGNESIUM 324 MG
1 TABLET ORAL
Qty: 0 | Refills: 0 | DISCHARGE
Start: 2019-11-12

## 2019-11-12 RX ORDER — MORPHINE SULFATE 50 MG/1
2 CAPSULE, EXTENDED RELEASE ORAL ONCE
Refills: 0 | Status: DISCONTINUED | OUTPATIENT
Start: 2019-11-12 | End: 2019-11-12

## 2019-11-12 RX ORDER — CELECOXIB 200 MG/1
1 CAPSULE ORAL
Qty: 0 | Refills: 0 | DISCHARGE
Start: 2019-11-12

## 2019-11-12 RX ORDER — OXYCODONE HYDROCHLORIDE 5 MG/1
1 TABLET ORAL
Qty: 0 | Refills: 0 | DISCHARGE
Start: 2019-11-12

## 2019-11-12 RX ORDER — ASCORBIC ACID 60 MG
1 TABLET,CHEWABLE ORAL
Qty: 0 | Refills: 0 | DISCHARGE
Start: 2019-11-12

## 2019-11-12 RX ORDER — SENNA PLUS 8.6 MG/1
2 TABLET ORAL
Qty: 0 | Refills: 0 | DISCHARGE
Start: 2019-11-12

## 2019-11-12 RX ORDER — ASPIRIN/CALCIUM CARB/MAGNESIUM 324 MG
1 TABLET ORAL
Qty: 0 | Refills: 0 | DISCHARGE

## 2019-11-12 RX ORDER — PANTOPRAZOLE SODIUM 20 MG/1
1 TABLET, DELAYED RELEASE ORAL
Qty: 0 | Refills: 0 | DISCHARGE
Start: 2019-11-12

## 2019-11-12 RX ADMIN — Medication 650 MILLIGRAM(S): at 06:00

## 2019-11-12 RX ADMIN — Medication 650 MILLIGRAM(S): at 13:18

## 2019-11-12 RX ADMIN — CELECOXIB 200 MILLIGRAM(S): 200 CAPSULE ORAL at 18:27

## 2019-11-12 RX ADMIN — PANTOPRAZOLE SODIUM 40 MILLIGRAM(S): 20 TABLET, DELAYED RELEASE ORAL at 07:59

## 2019-11-12 RX ADMIN — Medication 325 MILLIGRAM(S): at 05:03

## 2019-11-12 RX ADMIN — Medication 650 MILLIGRAM(S): at 00:09

## 2019-11-12 RX ADMIN — OXYCODONE HYDROCHLORIDE 10 MILLIGRAM(S): 5 TABLET ORAL at 05:02

## 2019-11-12 RX ADMIN — CELECOXIB 200 MILLIGRAM(S): 200 CAPSULE ORAL at 06:00

## 2019-11-12 RX ADMIN — OXYCODONE HYDROCHLORIDE 10 MILLIGRAM(S): 5 TABLET ORAL at 01:05

## 2019-11-12 RX ADMIN — Medication 650 MILLIGRAM(S): at 18:55

## 2019-11-12 RX ADMIN — Medication 650 MILLIGRAM(S): at 05:02

## 2019-11-12 RX ADMIN — MORPHINE SULFATE 2 MILLIGRAM(S): 50 CAPSULE, EXTENDED RELEASE ORAL at 06:50

## 2019-11-12 RX ADMIN — POLYETHYLENE GLYCOL 3350 17 GRAM(S): 17 POWDER, FOR SOLUTION ORAL at 12:18

## 2019-11-12 RX ADMIN — OXYCODONE HYDROCHLORIDE 10 MILLIGRAM(S): 5 TABLET ORAL at 06:00

## 2019-11-12 RX ADMIN — Medication 500 MILLIGRAM(S): at 05:03

## 2019-11-12 RX ADMIN — OXYCODONE HYDROCHLORIDE 10 MILLIGRAM(S): 5 TABLET ORAL at 16:19

## 2019-11-12 RX ADMIN — OXYCODONE HYDROCHLORIDE 10 MILLIGRAM(S): 5 TABLET ORAL at 00:09

## 2019-11-12 RX ADMIN — Medication 650 MILLIGRAM(S): at 01:05

## 2019-11-12 RX ADMIN — Medication 1 TABLET(S): at 12:18

## 2019-11-12 RX ADMIN — Medication 650 MILLIGRAM(S): at 18:27

## 2019-11-12 RX ADMIN — Medication 102 MILLIGRAM(S): at 06:27

## 2019-11-12 RX ADMIN — SODIUM CHLORIDE 3 MILLILITER(S): 9 INJECTION INTRAMUSCULAR; INTRAVENOUS; SUBCUTANEOUS at 05:01

## 2019-11-12 RX ADMIN — Medication 650 MILLIGRAM(S): at 12:18

## 2019-11-12 RX ADMIN — Medication 100 MILLIGRAM(S): at 04:53

## 2019-11-12 RX ADMIN — Medication 500 MILLIGRAM(S): at 18:27

## 2019-11-12 RX ADMIN — CELECOXIB 200 MILLIGRAM(S): 200 CAPSULE ORAL at 05:03

## 2019-11-12 RX ADMIN — SODIUM CHLORIDE 3 MILLILITER(S): 9 INJECTION INTRAMUSCULAR; INTRAVENOUS; SUBCUTANEOUS at 14:11

## 2019-11-12 RX ADMIN — MORPHINE SULFATE 2 MILLIGRAM(S): 50 CAPSULE, EXTENDED RELEASE ORAL at 07:20

## 2019-11-12 RX ADMIN — LOSARTAN POTASSIUM 100 MILLIGRAM(S): 100 TABLET, FILM COATED ORAL at 05:03

## 2019-11-12 RX ADMIN — Medication 325 MILLIGRAM(S): at 18:27

## 2019-11-12 RX ADMIN — CELECOXIB 200 MILLIGRAM(S): 200 CAPSULE ORAL at 18:55

## 2019-11-12 RX ADMIN — OXYCODONE HYDROCHLORIDE 10 MILLIGRAM(S): 5 TABLET ORAL at 17:15

## 2019-11-12 NOTE — PROGRESS NOTE ADULT - SUBJECTIVE AND OBJECTIVE BOX
POD#1 S/P Right LINA   79yFemale Patient seen and examined with Dr. Hayward, Pain controlled  Patient Denies SOB, CP, N/V/D       PE: Right Hip/LE: Dressing C/D/I, Sensation/motor intact, DP 2+, FROM ankle/toes   B/L LE: Skin intact. +ROM hip/knee/ankle/toes. Ankle Dorsi/plantarflexion: 5/5. Calf: soft, compressible and nontender. DP/PT 2+ NVI                          10.8   14.87 )-----------( 255      ( 12 Nov 2019 06:31 )             32.5       11-12    136  |  103  |  17  ----------------------------<  118<H>  4.3   |  28  |  0.74    Ca    7.9<L>      12 Nov 2019 06:32          A: As above   P: Pain Control       DVT Prophylaxis      Incentive spirometry      Total hip precautions reviewed       PT WBAT RLE      Isometric exercises      Discharge Planning      All the above discussed and understood by pt       Ortho to F/U

## 2019-11-12 NOTE — DISCHARGE NOTE NURSING/CASE MANAGEMENT/SOCIAL WORK - PATIENT PORTAL LINK FT
You can access the FollowMyHealth Patient Portal offered by [facility] by registering at the following website: [email or web address]. By joining [employer]’s FollowMyHealth portal, you will also be able to view your health information using other applications (apps) compatible with our system.

## 2019-11-12 NOTE — PROGRESS NOTE ADULT - SUBJECTIVE AND OBJECTIVE BOX
LILLIAN DENIS is a 79y Female s/p RIGHT TOTAL HIP REPLACEMENT      denies any chest pain shortness of breath palpitation dizziness lightheadedness nausea vomiting fever or chills    T(C): 37 (11-12-19 @ 07:40), Max: 37.2 (11-11-19 @ 23:40)  HR: 90 (11-12-19 @ 07:40) (62 - 99)  BP: 120/62 (11-12-19 @ 07:40) (116/73 - 148/83)  RR: 16 (11-12-19 @ 07:40) (13 - 21)  SpO2: 96% (11-12-19 @ 07:40) (94% - 100%)  no jvd/bruit  s1 s2 rrr  cta  s/nt/nd  no calf tend                          10.8   14.87 )-----------( 255      ( 12 Nov 2019 06:31 )             32.5   11-12    136  |  103  |  17  ----------------------------<  118<H>  4.3   |  28  |  0.74    Ca    7.9<L>      12 Nov 2019 06:32    ^wbc post fu op  cont dvt px  pain control  bowel regimen  antiemetics  incentive spirometer

## 2019-11-13 LAB — SURGICAL PATHOLOGY STUDY: SIGNIFICANT CHANGE UP

## 2019-11-14 DIAGNOSIS — R73.03 PREDIABETES: ICD-10-CM

## 2019-11-14 DIAGNOSIS — M16.11 UNILATERAL PRIMARY OSTEOARTHRITIS, RIGHT HIP: ICD-10-CM

## 2019-11-14 DIAGNOSIS — E78.5 HYPERLIPIDEMIA, UNSPECIFIED: ICD-10-CM

## 2019-11-14 DIAGNOSIS — I10 ESSENTIAL (PRIMARY) HYPERTENSION: ICD-10-CM

## 2020-03-04 PROBLEM — I10 ESSENTIAL (PRIMARY) HYPERTENSION: Chronic | Status: ACTIVE | Noted: 2019-10-29

## 2020-05-11 ENCOUNTER — RESULT REVIEW (OUTPATIENT)
Age: 80
End: 2020-05-11

## 2020-05-22 ENCOUNTER — OUTPATIENT (OUTPATIENT)
Dept: OUTPATIENT SERVICES | Facility: HOSPITAL | Age: 80
LOS: 1 days | Discharge: HOME | End: 2020-05-22
Payer: MEDICARE

## 2020-05-22 VITALS
HEIGHT: 67 IN | TEMPERATURE: 98 F | SYSTOLIC BLOOD PRESSURE: 142 MMHG | RESPIRATION RATE: 18 BRPM | WEIGHT: 167.33 LBS | DIASTOLIC BLOOD PRESSURE: 60 MMHG | HEART RATE: 73 BPM | OXYGEN SATURATION: 97 %

## 2020-05-22 DIAGNOSIS — Z98.890 OTHER SPECIFIED POSTPROCEDURAL STATES: Chronic | ICD-10-CM

## 2020-05-22 DIAGNOSIS — M16.12 UNILATERAL PRIMARY OSTEOARTHRITIS, LEFT HIP: ICD-10-CM

## 2020-05-22 DIAGNOSIS — Z90.49 ACQUIRED ABSENCE OF OTHER SPECIFIED PARTS OF DIGESTIVE TRACT: Chronic | ICD-10-CM

## 2020-05-22 DIAGNOSIS — Z98.891 HISTORY OF UTERINE SCAR FROM PREVIOUS SURGERY: Chronic | ICD-10-CM

## 2020-05-22 DIAGNOSIS — I10 ESSENTIAL (PRIMARY) HYPERTENSION: ICD-10-CM

## 2020-05-22 DIAGNOSIS — Z96.641 PRESENCE OF RIGHT ARTIFICIAL HIP JOINT: Chronic | ICD-10-CM

## 2020-05-22 DIAGNOSIS — E78.5 HYPERLIPIDEMIA, UNSPECIFIED: ICD-10-CM

## 2020-05-22 DIAGNOSIS — Z01.818 ENCOUNTER FOR OTHER PREPROCEDURAL EXAMINATION: ICD-10-CM

## 2020-05-22 LAB
A1C WITH ESTIMATED AVERAGE GLUCOSE RESULT: 6 % — HIGH (ref 4–5.6)
ANION GAP SERPL CALC-SCNC: 1 MMOL/L — LOW (ref 5–17)
BASOPHILS # BLD AUTO: 0.07 K/UL — SIGNIFICANT CHANGE UP (ref 0–0.2)
BASOPHILS NFR BLD AUTO: 0.8 % — SIGNIFICANT CHANGE UP (ref 0–2)
BLD GP AB SCN SERPL QL: SIGNIFICANT CHANGE UP
BUN SERPL-MCNC: 22 MG/DL — SIGNIFICANT CHANGE UP (ref 7–23)
CALCIUM SERPL-MCNC: 8.6 MG/DL — SIGNIFICANT CHANGE UP (ref 8.5–10.1)
CHLORIDE SERPL-SCNC: 112 MMOL/L — HIGH (ref 96–108)
CO2 SERPL-SCNC: 31 MMOL/L — SIGNIFICANT CHANGE UP (ref 22–31)
CREAT SERPL-MCNC: 0.63 MG/DL — SIGNIFICANT CHANGE UP (ref 0.5–1.3)
EOSINOPHIL # BLD AUTO: 0.26 K/UL — SIGNIFICANT CHANGE UP (ref 0–0.5)
EOSINOPHIL NFR BLD AUTO: 3.1 % — SIGNIFICANT CHANGE UP (ref 0–6)
ESTIMATED AVERAGE GLUCOSE: 126 MG/DL — HIGH (ref 68–114)
GLUCOSE SERPL-MCNC: 120 MG/DL — HIGH (ref 70–99)
HCT VFR BLD CALC: 40.1 % — SIGNIFICANT CHANGE UP (ref 34.5–45)
HGB BLD-MCNC: 13.4 G/DL — SIGNIFICANT CHANGE UP (ref 11.5–15.5)
IMM GRANULOCYTES NFR BLD AUTO: 0.2 % — SIGNIFICANT CHANGE UP (ref 0–1.5)
INR BLD: 0.99 RATIO — SIGNIFICANT CHANGE UP (ref 0.88–1.16)
LYMPHOCYTES # BLD AUTO: 2.61 K/UL — SIGNIFICANT CHANGE UP (ref 1–3.3)
LYMPHOCYTES # BLD AUTO: 30.9 % — SIGNIFICANT CHANGE UP (ref 13–44)
MCHC RBC-ENTMCNC: 28.7 PG — SIGNIFICANT CHANGE UP (ref 27–34)
MCHC RBC-ENTMCNC: 33.4 GM/DL — SIGNIFICANT CHANGE UP (ref 32–36)
MCV RBC AUTO: 85.9 FL — SIGNIFICANT CHANGE UP (ref 80–100)
MONOCYTES # BLD AUTO: 0.68 K/UL — SIGNIFICANT CHANGE UP (ref 0–0.9)
MONOCYTES NFR BLD AUTO: 8 % — SIGNIFICANT CHANGE UP (ref 2–14)
MRSA PCR RESULT.: SIGNIFICANT CHANGE UP
NEUTROPHILS # BLD AUTO: 4.81 K/UL — SIGNIFICANT CHANGE UP (ref 1.8–7.4)
NEUTROPHILS NFR BLD AUTO: 57 % — SIGNIFICANT CHANGE UP (ref 43–77)
NRBC # BLD: 0 /100 WBCS — SIGNIFICANT CHANGE UP (ref 0–0)
PLATELET # BLD AUTO: 300 K/UL — SIGNIFICANT CHANGE UP (ref 150–400)
POTASSIUM SERPL-MCNC: 4.4 MMOL/L — SIGNIFICANT CHANGE UP (ref 3.5–5.3)
POTASSIUM SERPL-SCNC: 4.4 MMOL/L — SIGNIFICANT CHANGE UP (ref 3.5–5.3)
PROTHROM AB SERPL-ACNC: 11 SEC — SIGNIFICANT CHANGE UP (ref 10–12.9)
RBC # BLD: 4.67 M/UL — SIGNIFICANT CHANGE UP (ref 3.8–5.2)
RBC # FLD: 13.4 % — SIGNIFICANT CHANGE UP (ref 10.3–14.5)
S AUREUS DNA NOSE QL NAA+PROBE: SIGNIFICANT CHANGE UP
SODIUM SERPL-SCNC: 144 MMOL/L — SIGNIFICANT CHANGE UP (ref 135–145)
WBC # BLD: 8.45 K/UL — SIGNIFICANT CHANGE UP (ref 3.8–10.5)
WBC # FLD AUTO: 8.45 K/UL — SIGNIFICANT CHANGE UP (ref 3.8–10.5)

## 2020-05-22 PROCEDURE — 93010 ELECTROCARDIOGRAM REPORT: CPT

## 2020-05-22 NOTE — OCCUPATIONAL THERAPY INITIAL EVALUATION ADULT - RANGE OF MOTION EXAMINATION, LOWER EXTREMITY
bilateral LE Active ROM was WFL  (within functional limits)/bilateral LE Passive ROM was WFL  (within functional limits)/ROM is limited in left hip due to pain .

## 2020-05-22 NOTE — H&P PST ADULT - NSICDXPASTSURGICALHX_GEN_ALL_CORE_FT
PAST SURGICAL HISTORY:  H/O bilateral breast reduction surgery     H/O hand surgery left    H/O laminectomy     History of      History of tonsillectomy and adenoidectomy     History of total right hip replacement     S/P appendectomy

## 2020-05-22 NOTE — OCCUPATIONAL THERAPY INITIAL EVALUATION ADULT - ANTICIPATED DISCHARGE DISPOSITION, OT EVAL
Home with OT referral  to prevent falls, improve balance, muscle strength, and endurance in order for the pt to perform ADL management and functional mobility in a safe manner. Pt owns a rolling walker , SAC and 3:1 commode; all are in good conditions and easily accessible.

## 2020-05-22 NOTE — OCCUPATIONAL THERAPY INITIAL EVALUATION ADULT - ADDITIONAL COMMENTS
Presently, pt is  functioning in her roles, self sufficient, driving  & ambulating independently without any assistive devices. Pt owns al the necessary DME. Pt is right hand dominant and wears glasses for reading. Pt c/o 5/10 pain in her left hip . This intensifies t0 8/10 at night, with changes in the weather, walking, prolonged sitting , standing  and it impacts ADL management  ;pt take Tylenol PRN for pain relief. Pt scores 90% of patient specific scale . Presently, pt is  functioning in her roles, self sufficient, driving  & ambulating independently without any assistive devices. Pt owns al the necessary DME. Pt is right hand dominant and wears glasses for reading. Pt c/o 5/10 pain in her left hip in the mornings. This intensifies to 8/10 at night, with changes in the weather, walking, prolonged sitting , standing  and it impacts ADL management  ; pt takes Naproxen PRN for pain relief. Pt scores 90% of patient specific scale .

## 2020-05-22 NOTE — PHYSICAL THERAPY INITIAL EVALUATION ADULT - MODIFIED CLINICAL TEST OF SENSORY INTEGRATION IN BALANCE TEST
5x Sit to Stand Test = (B hands used) 19 seconds, indicating significant impairment c functional mobility & strength  ;

## 2020-05-22 NOTE — H&P PST ADULT - NSICDXPROBLEM_GEN_ALL_CORE_FT
PROBLEM DIAGNOSES  Problem: Unilateral primary osteoarthritis, left hip  Assessment and Plan: Left total hip arthroplasty  labs - cbc,pt/ptt,bmp,t&s,nose cx,ekg  M/C required  preop 3 day hibiclens instruction reviewed and given .instructed on if  nose cx positive use mupuricin 5 days and checklist given  take routine meds DOS with sips of water. avoid NSAID and OTC supplements. verbalized understanding  information on proper nutrition , increase protein and better food choices provided in packet   Ensure clear given    Problem: HTN (hypertension)  Assessment and Plan: Continue current regimen and medications.     Problem: HLD (hyperlipidemia)  Assessment and Plan: Continue current regimen and medications.

## 2020-05-22 NOTE — H&P PST ADULT - ASSESSMENT
79F pmh htn, hl c/o left  hip pain 2/2 unilateral primary osteoarthritis here for PST for scheduled left  total hip replacement  CAPRINI SCORE    AGE RELATED RISK FACTORS                                                       MOBILITY RELATED FACTORS  [ ] Age 41-60 years                                            (1 Point)                  [ ] Bed rest                                                        (1 Point)  [ ] Age: 61-74 years                                           (2 Points)                [ ] Plaster cast                                                   (2 Points)  [x ] Age= 75 years                                              (3 Points)                 [ ] Bed bound for more than 72 hours                   (2 Points)    DISEASE RELATED RISK FACTORS                                               GENDER SPECIFIC FACTORS  [x ] Edema in the lower extremities                       (1 Point)                  [ ] Pregnancy                                                     (1 Point)  [ ] Varicose veins                                               (1 Point)                  [ ] Post-partum < 6 weeks                                   (1 Point)             [x ] BMI > 25 Kg/m2                                            (1 Point)                  [ ] Hormonal therapy  or oral contraception            (1 Point)                 [ ] Sepsis (in the previous month)                        (1 Point)                  [ ] History of pregnancy complications  [ ] Pneumonia or serious lung disease                                               [ ] Unexplained or recurrent                       (1 Point)           (in the previous month)                               (1 Point)  [ ] Abnormal pulmonary function test                     (1 Point)                 SURGERY RELATED RISK FACTORS  [ ] Acute myocardial infarction                              (1 Point)                 [ ]  Section                                            (1 Point)  [ ] Congestive heart failure (in the previous month)  (1 Point)                 [ ] Minor surgery                                                 (1 Point)   [ ] Inflammatory bowel disease                             (1 Point)                 [ ] Arthroscopic surgery                                        (2 Points)  [ ] Central venous access                                    (2 Points)                [ ] General surgery lasting more than 45 minutes   (2 Points)       [ ] Stroke (in the previous month)                          (5 Points)               [x ] Elective arthroplasty                                        (5 Points)                                                                                                                                               HEMATOLOGY RELATED FACTORS                                                 TRAUMA RELATED RISK FACTORS  [ ] Prior episodes of VTE                                     (3 Points)                 [ ] Fracture of the hip, pelvis, or leg                       (5 Points)  [ ] Positive family history for VTE                         (3 Points)                 [ ] Acute spinal cord injury (in the previous month)  (5 Points)  [ ] Prothrombin 33735 A                                      (3 Points)                 [ ] Paralysis  (less than 1 month)                          (5 Points)  [ ] Factor V Leiden                                             (3 Points)                 [ ] Multiple Trauma within 1 month                         (5 Points)  [ ] Lupus anticoagulants                                     (3 Points)                                                           [ ] Anticardiolipin antibodies                                (3 Points)                                                       [ ] High homocysteine in the blood                      (3 Points)                                             [ ] Other congenital or acquired thrombophilia       (3 Points)                                                [ ] Heparin induced thrombocytopenia                  (3 Points)                                          Total Score [       10   ]

## 2020-05-22 NOTE — OCCUPATIONAL THERAPY INITIAL EVALUATION ADULT - SOCIAL CONCERNS
Complex psychosocial needs/coping issues/Pt voiced concerns about her recovery at home. Pt has  her spouse to assist her after discharge home post-operatively. Pt voiced concerns about her recovery at home. Pt has her spouse to assist her after discharge home post-operatively./Complex psychosocial needs/coping issues

## 2020-05-22 NOTE — H&P PST ADULT - HISTORY OF PRESENT ILLNESS
79F pmh htn, hl c/o left  hip pain 2/2 unilateral primary osteoarthritis here for PST for scheduled left  total hip replacement  Looking forward to returning to gardening 79F pmh htn, hl c/o left  hip pain 2/2 unilateral primary osteoarthritis here for PST for scheduled left  total hip replacement  Looking forward to returning to [facility]  Patient denies any travel outside the [place] in the past 30days.  Patient denies any recent fever, cough, SOB, runny nose, rash or flu like symptoms

## 2020-05-22 NOTE — PHYSICAL THERAPY INITIAL EVALUATION ADULT - ADDITIONAL COMMENTS
Pt lives in a private home with 4-5 steps to enter with B handrails. Once inside there are no steps to negotiate. Pt has a tub shower, no grab bars, retractable shower head, standard toilet height. Pt reports that a 3:1 commode can fit over toilet. Pt has a rolling walker, straight cane, commode. Pain is 5/10 at rest and can increase to 7-8/10, worse with stairs, sit to stand, weather changes and prolonged standing. Pt gets some relief with pain medication, no adverse reactions to pain medications reported, no recent falls, has experienced buckling. Pt was in outpatient PT until February 2020. Pt had R LINA done in November 2019. Pt wears glasses for reading, is R hand dominant, drives, no hearing aids.

## 2020-05-22 NOTE — OCCUPATIONAL THERAPY INITIAL EVALUATION ADULT - LIVES WITH, PROFILE
spouse/her spouse in a private house with 4-5 steps to enter, equipped with close, bilateral hand rails.  All living amenities are located on one level. The bathroom has a tub/shower combination, retractable shower head and standard toilet. Pt's toilet has adequate space to fit a commode over it.

## 2020-06-02 ENCOUNTER — TRANSCRIPTION ENCOUNTER (OUTPATIENT)
Age: 80
End: 2020-06-02

## 2020-06-02 LAB — SARS-COV-2 RNA SPEC QL NAA+PROBE: SIGNIFICANT CHANGE UP

## 2020-06-03 ENCOUNTER — INPATIENT (INPATIENT)
Facility: HOSPITAL | Age: 80
LOS: 0 days | Discharge: HOME HEALTH | End: 2020-06-04
Attending: ORTHOPAEDIC SURGERY | Admitting: ORTHOPAEDIC SURGERY
Payer: MEDICARE

## 2020-06-03 ENCOUNTER — RESULT REVIEW (OUTPATIENT)
Age: 80
End: 2020-06-03

## 2020-06-03 ENCOUNTER — TRANSCRIPTION ENCOUNTER (OUTPATIENT)
Age: 80
End: 2020-06-03

## 2020-06-03 VITALS
TEMPERATURE: 98 F | RESPIRATION RATE: 18 BRPM | WEIGHT: 169.09 LBS | OXYGEN SATURATION: 98 % | DIASTOLIC BLOOD PRESSURE: 66 MMHG | HEIGHT: 68 IN | HEART RATE: 82 BPM | SYSTOLIC BLOOD PRESSURE: 176 MMHG

## 2020-06-03 DIAGNOSIS — Z98.890 OTHER SPECIFIED POSTPROCEDURAL STATES: Chronic | ICD-10-CM

## 2020-06-03 DIAGNOSIS — Z90.49 ACQUIRED ABSENCE OF OTHER SPECIFIED PARTS OF DIGESTIVE TRACT: Chronic | ICD-10-CM

## 2020-06-03 DIAGNOSIS — Z96.641 PRESENCE OF RIGHT ARTIFICIAL HIP JOINT: Chronic | ICD-10-CM

## 2020-06-03 DIAGNOSIS — Z98.891 HISTORY OF UTERINE SCAR FROM PREVIOUS SURGERY: Chronic | ICD-10-CM

## 2020-06-03 LAB
BLD GP AB SCN SERPL QL: SIGNIFICANT CHANGE UP
HCT VFR BLD CALC: 36 % — SIGNIFICANT CHANGE UP (ref 34.5–45)
HGB BLD-MCNC: 12.2 G/DL — SIGNIFICANT CHANGE UP (ref 11.5–15.5)
MCHC RBC-ENTMCNC: 29 PG — SIGNIFICANT CHANGE UP (ref 27–34)
MCHC RBC-ENTMCNC: 33.9 GM/DL — SIGNIFICANT CHANGE UP (ref 32–36)
MCV RBC AUTO: 85.5 FL — SIGNIFICANT CHANGE UP (ref 80–100)
NRBC # BLD: 0 /100 WBCS — SIGNIFICANT CHANGE UP (ref 0–0)
PLATELET # BLD AUTO: 286 K/UL — SIGNIFICANT CHANGE UP (ref 150–400)
RBC # BLD: 4.21 M/UL — SIGNIFICANT CHANGE UP (ref 3.8–5.2)
RBC # FLD: 13.3 % — SIGNIFICANT CHANGE UP (ref 10.3–14.5)
WBC # BLD: 10.37 K/UL — SIGNIFICANT CHANGE UP (ref 3.8–10.5)
WBC # FLD AUTO: 10.37 K/UL — SIGNIFICANT CHANGE UP (ref 3.8–10.5)

## 2020-06-03 PROCEDURE — 88305 TISSUE EXAM BY PATHOLOGIST: CPT | Mod: 26

## 2020-06-03 PROCEDURE — 72170 X-RAY EXAM OF PELVIS: CPT | Mod: 26

## 2020-06-03 PROCEDURE — 88311 DECALCIFY TISSUE: CPT | Mod: 26

## 2020-06-03 RX ORDER — MAGNESIUM HYDROXIDE 400 MG/1
30 TABLET, CHEWABLE ORAL DAILY
Refills: 0 | Status: DISCONTINUED | OUTPATIENT
Start: 2020-06-03 | End: 2020-06-04

## 2020-06-03 RX ORDER — OXYCODONE HYDROCHLORIDE 5 MG/1
5 TABLET ORAL EVERY 4 HOURS
Refills: 0 | Status: DISCONTINUED | OUTPATIENT
Start: 2020-06-03 | End: 2020-06-04

## 2020-06-03 RX ORDER — OXYCODONE HYDROCHLORIDE 5 MG/1
10 TABLET ORAL EVERY 4 HOURS
Refills: 0 | Status: DISCONTINUED | OUTPATIENT
Start: 2020-06-03 | End: 2020-06-04

## 2020-06-03 RX ORDER — CEFAZOLIN SODIUM 1 G
2000 VIAL (EA) INJECTION EVERY 8 HOURS
Refills: 0 | Status: COMPLETED | OUTPATIENT
Start: 2020-06-03 | End: 2020-06-04

## 2020-06-03 RX ORDER — METOCLOPRAMIDE HCL 10 MG
10 TABLET ORAL ONCE
Refills: 0 | Status: DISCONTINUED | OUTPATIENT
Start: 2020-06-03 | End: 2020-06-03

## 2020-06-03 RX ORDER — HYDROMORPHONE HYDROCHLORIDE 2 MG/ML
0.5 INJECTION INTRAMUSCULAR; INTRAVENOUS; SUBCUTANEOUS
Refills: 0 | Status: DISCONTINUED | OUTPATIENT
Start: 2020-06-03 | End: 2020-06-03

## 2020-06-03 RX ORDER — CELECOXIB 200 MG/1
200 CAPSULE ORAL ONCE
Refills: 0 | Status: COMPLETED | OUTPATIENT
Start: 2020-06-03 | End: 2020-06-03

## 2020-06-03 RX ORDER — ATORVASTATIN CALCIUM 80 MG/1
20 TABLET, FILM COATED ORAL AT BEDTIME
Refills: 0 | Status: DISCONTINUED | OUTPATIENT
Start: 2020-06-03 | End: 2020-06-04

## 2020-06-03 RX ORDER — SODIUM CHLORIDE 9 MG/ML
1000 INJECTION, SOLUTION INTRAVENOUS
Refills: 0 | Status: DISCONTINUED | OUTPATIENT
Start: 2020-06-03 | End: 2020-06-04

## 2020-06-03 RX ORDER — TRANEXAMIC ACID 100 MG/ML
1000 INJECTION, SOLUTION INTRAVENOUS ONCE
Refills: 0 | Status: COMPLETED | OUTPATIENT
Start: 2020-06-03 | End: 2020-06-03

## 2020-06-03 RX ORDER — ACETAMINOPHEN 500 MG
650 TABLET ORAL EVERY 6 HOURS
Refills: 0 | Status: DISCONTINUED | OUTPATIENT
Start: 2020-06-03 | End: 2020-06-04

## 2020-06-03 RX ORDER — SODIUM CHLORIDE 9 MG/ML
3 INJECTION INTRAMUSCULAR; INTRAVENOUS; SUBCUTANEOUS EVERY 8 HOURS
Refills: 0 | Status: DISCONTINUED | OUTPATIENT
Start: 2020-06-03 | End: 2020-06-03

## 2020-06-03 RX ORDER — CELECOXIB 200 MG/1
200 CAPSULE ORAL
Refills: 0 | Status: DISCONTINUED | OUTPATIENT
Start: 2020-06-04 | End: 2020-06-04

## 2020-06-03 RX ORDER — ONDANSETRON 8 MG/1
4 TABLET, FILM COATED ORAL EVERY 6 HOURS
Refills: 0 | Status: DISCONTINUED | OUTPATIENT
Start: 2020-06-03 | End: 2020-06-04

## 2020-06-03 RX ORDER — ACETAMINOPHEN 500 MG
650 TABLET ORAL ONCE
Refills: 0 | Status: COMPLETED | OUTPATIENT
Start: 2020-06-03 | End: 2020-06-03

## 2020-06-03 RX ORDER — ASPIRIN/CALCIUM CARB/MAGNESIUM 324 MG
325 TABLET ORAL
Refills: 0 | Status: DISCONTINUED | OUTPATIENT
Start: 2020-06-04 | End: 2020-06-04

## 2020-06-03 RX ORDER — DEXAMETHASONE 0.5 MG/5ML
10 ELIXIR ORAL ONCE
Refills: 0 | Status: COMPLETED | OUTPATIENT
Start: 2020-06-04 | End: 2020-06-04

## 2020-06-03 RX ORDER — SENNA PLUS 8.6 MG/1
2 TABLET ORAL AT BEDTIME
Refills: 0 | Status: DISCONTINUED | OUTPATIENT
Start: 2020-06-03 | End: 2020-06-04

## 2020-06-03 RX ORDER — PANTOPRAZOLE SODIUM 20 MG/1
40 TABLET, DELAYED RELEASE ORAL
Refills: 0 | Status: DISCONTINUED | OUTPATIENT
Start: 2020-06-03 | End: 2020-06-04

## 2020-06-03 RX ORDER — ACETAMINOPHEN 500 MG
1000 TABLET ORAL ONCE
Refills: 0 | Status: COMPLETED | OUTPATIENT
Start: 2020-06-03 | End: 2020-06-03

## 2020-06-03 RX ORDER — MORPHINE SULFATE 50 MG/1
4 CAPSULE, EXTENDED RELEASE ORAL ONCE
Refills: 0 | Status: DISCONTINUED | OUTPATIENT
Start: 2020-06-03 | End: 2020-06-04

## 2020-06-03 RX ORDER — SODIUM CHLORIDE 9 MG/ML
1000 INJECTION, SOLUTION INTRAVENOUS
Refills: 0 | Status: DISCONTINUED | OUTPATIENT
Start: 2020-06-03 | End: 2020-06-03

## 2020-06-03 RX ORDER — POLYETHYLENE GLYCOL 3350 17 G/17G
17 POWDER, FOR SOLUTION ORAL DAILY
Refills: 0 | Status: DISCONTINUED | OUTPATIENT
Start: 2020-06-03 | End: 2020-06-04

## 2020-06-03 RX ORDER — LOSARTAN POTASSIUM 100 MG/1
100 TABLET, FILM COATED ORAL DAILY
Refills: 0 | Status: DISCONTINUED | OUTPATIENT
Start: 2020-06-03 | End: 2020-06-04

## 2020-06-03 RX ORDER — ASCORBIC ACID 60 MG
500 TABLET,CHEWABLE ORAL
Refills: 0 | Status: DISCONTINUED | OUTPATIENT
Start: 2020-06-03 | End: 2020-06-04

## 2020-06-03 RX ADMIN — SODIUM CHLORIDE 100 MILLILITER(S): 9 INJECTION, SOLUTION INTRAVENOUS at 15:18

## 2020-06-03 RX ADMIN — LOSARTAN POTASSIUM 100 MILLIGRAM(S): 100 TABLET, FILM COATED ORAL at 21:56

## 2020-06-03 RX ADMIN — SODIUM CHLORIDE 75 MILLILITER(S): 9 INJECTION, SOLUTION INTRAVENOUS at 12:40

## 2020-06-03 RX ADMIN — CELECOXIB 200 MILLIGRAM(S): 200 CAPSULE ORAL at 08:08

## 2020-06-03 RX ADMIN — Medication 650 MILLIGRAM(S): at 08:07

## 2020-06-03 RX ADMIN — TRANEXAMIC ACID 220 MILLIGRAM(S): 100 INJECTION, SOLUTION INTRAVENOUS at 12:58

## 2020-06-03 RX ADMIN — ATORVASTATIN CALCIUM 20 MILLIGRAM(S): 80 TABLET, FILM COATED ORAL at 21:56

## 2020-06-03 RX ADMIN — OXYCODONE HYDROCHLORIDE 10 MILLIGRAM(S): 5 TABLET ORAL at 16:45

## 2020-06-03 RX ADMIN — Medication 650 MILLIGRAM(S): at 19:08

## 2020-06-03 RX ADMIN — Medication 500 MILLIGRAM(S): at 19:08

## 2020-06-03 RX ADMIN — Medication 400 MILLIGRAM(S): at 12:39

## 2020-06-03 RX ADMIN — OXYCODONE HYDROCHLORIDE 10 MILLIGRAM(S): 5 TABLET ORAL at 22:02

## 2020-06-03 RX ADMIN — Medication 100 MILLIGRAM(S): at 16:50

## 2020-06-03 NOTE — PHYSICAL THERAPY INITIAL EVALUATION ADULT - ACTIVE RANGE OF MOTION EXAMINATION, REHAB EVAL
bilateral upper extremity Active ROM was WFL (within functional limits)/except L hip due to hip precautions and pain/bilateral  lower extremity Active ROM was WFL (within functional limits)

## 2020-06-03 NOTE — PHYSICAL THERAPY INITIAL EVALUATION ADULT - STRENGTHENING, PT EVAL
Patient will improve strength in L hip to 5/5 6-8 weeks to improve overall functional mobility including gait, transfers, bed mobility and decrease risk of falls.

## 2020-06-03 NOTE — OCCUPATIONAL THERAPY INITIAL EVALUATION ADULT - GENERAL OBSERVATIONS, REHAB EVAL
Pt encountered supine in bed, STACIE dressing, NAD. Pt c/o pain s/p left posterior total hip replacement.

## 2020-06-03 NOTE — PHYSICAL THERAPY INITIAL EVALUATION ADULT - GAIT TRAINING, PT EVAL
Patient will ambulate 500 feet with rolling walker independently for community ambulation in 2-3 days. Patient will ascend/descend 5 steps with bilat rail up/down and straight cane independently in 2-3 days to safely navigate home environment.

## 2020-06-03 NOTE — DISCHARGE NOTE PROVIDER - NSDCFUADDINST_GEN_ALL_CORE_FT
Hip replacement precautions: Abduction pillow. Elevate the leg (while keeping hip precautions) as often as possible to help control swelling. Incentive spirometer 10X/hr.     Keep STACIE Dressing Clean, Dry and Intact. May shower with STACIE Dressing. Please do not scrub, soak, peel or pick at the STACIE dressing. No creams, lotions, or oils over dressing. May shower and let water run over dressing, no baths. Pat dry once out of shower. Dressing to be removed in 7 days. If dressing is saturated from border to border - may remove and replace with clean dry dressing.    Shower instructions for STACIE Dressing: Place battery pack in a water sealed bag (such as a Ziplock bag) and keep battery out of the water.   Alternately you can turn battery pack off (press orange button.) Twist tubing OFF battery pack before entering shower. Once done with showering. Pat dressing dry. Reconnect tubing and twist ON battery pack after you are dry. Then turn battery pack on (press orange button.)

## 2020-06-03 NOTE — DISCHARGE NOTE PROVIDER - NSDCMRMEDTOKEN_GEN_ALL_CORE_FT
alendronate 70 mg oral tablet: 1 tab(s) orally once a week  ascorbic acid 500 mg oral tablet: 1 tab(s) orally 2 times a day  aspirin 81 mg oral tablet: 1 tab(s) orally once a day  atorvastatin: 20 milligram(s) orally once a day  Caltrate 600 + D oral tablet: 1 tab(s) orally 2 times a day  folic acid 0.4 mg oral tablet: 2 tab(s) orally once a day  Glucosamine Chondroitin oral capsule: 3 cap(s) orally once a day  losartan: 100 milligram(s) orally once a day  Multiple Vitamins oral tablet: 1 tab(s) orally once a day  Naprosyn 500 mg oral tablet: 1 tab(s) orally 2 times a day  senna oral tablet: 2 tab(s) orally once a day (at bedtime), As needed, Constipation  Vitamin B-12 1000 mcg oral tablet: 1 tab(s) orally once a day alendronate 70 mg oral tablet: 1 tab(s) orally once a week  ascorbic acid 500 mg oral tablet: 1 tab(s) orally 2 times a day  aspirin 325 mg oral delayed release tablet: 1 tab(s) orally 2 times a day MDD:2 Tabs  atorvastatin: 20 milligram(s) orally once a day  Caltrate 600 + D oral tablet: 1 tab(s) orally 2 times a day  celecoxib 200 mg oral capsule: 1 cap(s) orally 2 times a day MDD:2 Tabs  folic acid 0.4 mg oral tablet: 2 tab(s) orally once a day  Glucosamine Chondroitin oral capsule: 3 cap(s) orally once a day  losartan: 100 milligram(s) orally once a day  Multiple Vitamins oral tablet: 1 tab(s) orally once a day  oxyCODONE 5 mg oral tablet: 1 or 2  tab(s) orally every 4 hours, As needed MDD:6 Tabs  pantoprazole 40 mg oral delayed release tablet: 1 tab(s) orally once a day (before a meal) MDD:1 Tab  senna oral tablet: 2 tab(s) orally once a day (at bedtime), As needed, Constipation  Vitamin B-12 1000 mcg oral tablet: 1 tab(s) orally once a day

## 2020-06-03 NOTE — DISCHARGE NOTE PROVIDER - NSDCFUADDAPPT_GEN_ALL_CORE_FT
Follow up with your surgeon in two weeks. Call for appointment.  If you need more pain medication, call your surgeon's office. For medication refills or authorizations, please call [phone]  We recommend that you call and schedule a follow up appointment with your primary care physician for repeat blood work (CBC and BMP) for post hospital discharge follow-up care.  Call your surgeon if you have increased redness/pain/drainage or fever. Return to ER for shortness of breath/calf tenderness.

## 2020-06-03 NOTE — OCCUPATIONAL THERAPY INITIAL EVALUATION ADULT - PLANNED THERAPY INTERVENTIONS, OT EVAL
ADL retraining/transfer training/bed mobility training/ROM/strengthening/stretching/balance training

## 2020-06-03 NOTE — PHYSICAL THERAPY INITIAL EVALUATION ADULT - GENERAL OBSERVATIONS, REHAB EVAL
Pt encountered supine in bed, A&Ox4, +pneumatic TEDs, +IV (removed prior to ambulation), +ABD pillow, +C/D/I dressing to L hip, +STACIE, 6/10 L knee pain, NAD and comfortable.

## 2020-06-03 NOTE — PHYSICAL THERAPY INITIAL EVALUATION ADULT - CRITERIA FOR SKILLED THERAPEUTIC INTERVENTIONS
anticipated discharge recommendation/impairments found/risk reduction/prevention/rehab potential/functional limitations in following categories/therapy frequency/predicted duration of therapy intervention

## 2020-06-03 NOTE — DISCHARGE NOTE PROVIDER - NSDCACTIVITY_GEN_ALL_CORE
Walking - Outdoors allowed/Do not drive or operate machinery/Showering allowed/Walking - Indoors allowed/Stairs allowed/No heavy lifting/straining

## 2020-06-03 NOTE — OCCUPATIONAL THERAPY INITIAL EVALUATION ADULT - ADDITIONAL COMMENTS
Pre op assessment confirmed - Pt lives with spouse, who can assist, in a private home with 4-5 steps to enter with B handrails. Once inside there are no steps to negotiate. Pt has a tub shower, no grab bars, retractable shower head, standard toilet height. Pt reports that a 3:1 commode can fit over toilet. Pt has a rolling walker, straight cane, commode. Pain is 5/10 at rest and can increase to 7-8/10, worse with stairs, sit to stand, weather changes and prolonged standing. Pt gets some relief with pain medication, no adverse reactions to pain medications reported, no recent falls, has experienced buckling.

## 2020-06-03 NOTE — ASU PATIENT PROFILE, ADULT - PSH
H/O bilateral breast reduction surgery    H/O hand surgery  left  H/O laminectomy    History of     History of tonsillectomy and adenoidectomy    History of total right hip replacement    S/P appendectomy

## 2020-06-03 NOTE — PHYSICAL THERAPY INITIAL EVALUATION ADULT - RANGE OF MOTION, PT EVAL
Patient will improve ROM in L hip to WFL in 6 weeks in order to improve gait quality, functional mobility and overall safety.

## 2020-06-03 NOTE — DISCHARGE NOTE PROVIDER - CARE PROVIDER_API CALL
Rocco Hayward  ORTHOPAEDIC SURGERY  [address]  [address]  [place] [id number]  Phone: [phone]  Fax: [phone]  Follow Up Time:

## 2020-06-03 NOTE — DISCHARGE NOTE PROVIDER - HOSPITAL COURSE
79yFemale with history of  L Hip OA presenting for L LINA by Dr. Hayward on 6/3/2020. Risk and benefits of surgery were explained to the patient. The patient understood and agreed to proceed with surgery. Patient underwent the procedure with no intraoperative complications. Pt was brought in stable condition to the PACU. Once stable in PACU, pt was brought to the floor. During hospital stay pt was followed by Medicine, physical therapy, Home Care during this admission. Pt had an uneventful hospital course. Pt is stable for discharge to home

## 2020-06-04 ENCOUNTER — TRANSCRIPTION ENCOUNTER (OUTPATIENT)
Age: 80
End: 2020-06-04

## 2020-06-04 VITALS
DIASTOLIC BLOOD PRESSURE: 56 MMHG | OXYGEN SATURATION: 97 % | RESPIRATION RATE: 17 BRPM | HEART RATE: 78 BPM | SYSTOLIC BLOOD PRESSURE: 104 MMHG | TEMPERATURE: 98 F

## 2020-06-04 LAB
ANION GAP SERPL CALC-SCNC: 6 MMOL/L — SIGNIFICANT CHANGE UP (ref 5–17)
BUN SERPL-MCNC: 17 MG/DL — SIGNIFICANT CHANGE UP (ref 7–23)
CALCIUM SERPL-MCNC: 8.5 MG/DL — SIGNIFICANT CHANGE UP (ref 8.5–10.1)
CHLORIDE SERPL-SCNC: 108 MMOL/L — SIGNIFICANT CHANGE UP (ref 96–108)
CO2 SERPL-SCNC: 26 MMOL/L — SIGNIFICANT CHANGE UP (ref 22–31)
CREAT SERPL-MCNC: 0.69 MG/DL — SIGNIFICANT CHANGE UP (ref 0.5–1.3)
GLUCOSE SERPL-MCNC: 133 MG/DL — HIGH (ref 70–99)
HCT VFR BLD CALC: 34.1 % — LOW (ref 34.5–45)
HGB BLD-MCNC: 11.2 G/DL — LOW (ref 11.5–15.5)
MCHC RBC-ENTMCNC: 29 PG — SIGNIFICANT CHANGE UP (ref 27–34)
MCHC RBC-ENTMCNC: 32.8 GM/DL — SIGNIFICANT CHANGE UP (ref 32–36)
MCV RBC AUTO: 88.3 FL — SIGNIFICANT CHANGE UP (ref 80–100)
NRBC # BLD: 0 /100 WBCS — SIGNIFICANT CHANGE UP (ref 0–0)
PLATELET # BLD AUTO: 287 K/UL — SIGNIFICANT CHANGE UP (ref 150–400)
POTASSIUM SERPL-MCNC: 4.3 MMOL/L — SIGNIFICANT CHANGE UP (ref 3.5–5.3)
POTASSIUM SERPL-SCNC: 4.3 MMOL/L — SIGNIFICANT CHANGE UP (ref 3.5–5.3)
RBC # BLD: 3.86 M/UL — SIGNIFICANT CHANGE UP (ref 3.8–5.2)
RBC # FLD: 13.4 % — SIGNIFICANT CHANGE UP (ref 10.3–14.5)
SODIUM SERPL-SCNC: 140 MMOL/L — SIGNIFICANT CHANGE UP (ref 135–145)
WBC # BLD: 16.9 K/UL — HIGH (ref 3.8–10.5)
WBC # FLD AUTO: 16.9 K/UL — HIGH (ref 3.8–10.5)

## 2020-06-04 RX ORDER — PANTOPRAZOLE SODIUM 20 MG/1
1 TABLET, DELAYED RELEASE ORAL
Qty: 30 | Refills: 0
Start: 2020-06-04 | End: 2020-07-03

## 2020-06-04 RX ORDER — ASPIRIN/CALCIUM CARB/MAGNESIUM 324 MG
1 TABLET ORAL
Qty: 0 | Refills: 0 | DISCHARGE

## 2020-06-04 RX ORDER — OXYCODONE HYDROCHLORIDE 5 MG/1
1 TABLET ORAL
Qty: 42 | Refills: 0
Start: 2020-06-04 | End: 2020-06-10

## 2020-06-04 RX ORDER — CELECOXIB 200 MG/1
1 CAPSULE ORAL
Qty: 60 | Refills: 0
Start: 2020-06-04 | End: 2020-07-03

## 2020-06-04 RX ORDER — ASPIRIN/CALCIUM CARB/MAGNESIUM 324 MG
1 TABLET ORAL
Qty: 60 | Refills: 0
Start: 2020-06-04 | End: 2020-07-03

## 2020-06-04 RX ADMIN — Medication 100 MILLIGRAM(S): at 00:39

## 2020-06-04 RX ADMIN — Medication 650 MILLIGRAM(S): at 00:48

## 2020-06-04 RX ADMIN — CELECOXIB 200 MILLIGRAM(S): 200 CAPSULE ORAL at 05:50

## 2020-06-04 RX ADMIN — Medication 102 MILLIGRAM(S): at 05:54

## 2020-06-04 RX ADMIN — PANTOPRAZOLE SODIUM 40 MILLIGRAM(S): 20 TABLET, DELAYED RELEASE ORAL at 06:14

## 2020-06-04 RX ADMIN — Medication 1 TABLET(S): at 11:39

## 2020-06-04 RX ADMIN — OXYCODONE HYDROCHLORIDE 5 MILLIGRAM(S): 5 TABLET ORAL at 15:25

## 2020-06-04 RX ADMIN — OXYCODONE HYDROCHLORIDE 10 MILLIGRAM(S): 5 TABLET ORAL at 06:12

## 2020-06-04 RX ADMIN — Medication 650 MILLIGRAM(S): at 11:39

## 2020-06-04 RX ADMIN — Medication 325 MILLIGRAM(S): at 05:50

## 2020-06-04 RX ADMIN — Medication 500 MILLIGRAM(S): at 05:50

## 2020-06-04 RX ADMIN — SENNA PLUS 2 TABLET(S): 8.6 TABLET ORAL at 05:51

## 2020-06-04 RX ADMIN — Medication 650 MILLIGRAM(S): at 05:50

## 2020-06-04 NOTE — DISCHARGE NOTE NURSING/CASE MANAGEMENT/SOCIAL WORK - PATIENT PORTAL LINK FT
You can access the FollowMyHealth Patient Portal offered by [facility] by registering at the following website: [email or web address]. By joining [email or web address]’s FollowMyHealth portal, you will also be able to view your health information using other applications (apps) compatible with our system.

## 2020-06-04 NOTE — DISCHARGE NOTE NURSING/CASE MANAGEMENT/SOCIAL WORK - NSDCFUADDAPPT_GEN_ALL_CORE_FT
Follow up with your surgeon in two weeks. Call for appointment.  If you need more pain medication, call your surgeon's office. For medication refills or authorizations, please call [phone]  We recommend that you call and schedule a follow up appointment with your primary care physician for repeat blood work (CBC and BMP) for post hospital discharge follow-up care.  Call your surgeon if you have increased redness/pain/drainage or fever. Return to ER for shortness of breath/calf tenderness.

## 2020-06-05 LAB — SURGICAL PATHOLOGY STUDY: SIGNIFICANT CHANGE UP

## 2020-06-07 DIAGNOSIS — D72.829 ELEVATED WHITE BLOOD CELL COUNT, UNSPECIFIED: ICD-10-CM

## 2020-06-07 DIAGNOSIS — M16.12 UNILATERAL PRIMARY OSTEOARTHRITIS, LEFT HIP: ICD-10-CM

## 2020-06-07 DIAGNOSIS — E78.5 HYPERLIPIDEMIA, UNSPECIFIED: ICD-10-CM

## 2020-06-07 DIAGNOSIS — R73.03 PREDIABETES: ICD-10-CM

## 2020-06-07 DIAGNOSIS — I10 ESSENTIAL (PRIMARY) HYPERTENSION: ICD-10-CM

## 2020-06-07 DIAGNOSIS — D64.9 ANEMIA, UNSPECIFIED: ICD-10-CM

## 2020-10-22 PROBLEM — E78.5 HYPERLIPIDEMIA, UNSPECIFIED: Chronic | Status: ACTIVE | Noted: 2019-10-29

## 2020-10-26 ENCOUNTER — OUTPATIENT (OUTPATIENT)
Dept: OUTPATIENT SERVICES | Facility: HOSPITAL | Age: 80
LOS: 1 days | Discharge: HOME | End: 2020-10-26
Payer: MEDICARE

## 2020-10-26 VITALS
OXYGEN SATURATION: 97 % | HEIGHT: 67.5 IN | DIASTOLIC BLOOD PRESSURE: 59 MMHG | HEART RATE: 77 BPM | WEIGHT: 170.2 LBS | SYSTOLIC BLOOD PRESSURE: 129 MMHG | TEMPERATURE: 98 F

## 2020-10-26 DIAGNOSIS — M19.012 PRIMARY OSTEOARTHRITIS, LEFT SHOULDER: ICD-10-CM

## 2020-10-26 DIAGNOSIS — Z98.891 HISTORY OF UTERINE SCAR FROM PREVIOUS SURGERY: Chronic | ICD-10-CM

## 2020-10-26 DIAGNOSIS — Z90.49 ACQUIRED ABSENCE OF OTHER SPECIFIED PARTS OF DIGESTIVE TRACT: Chronic | ICD-10-CM

## 2020-10-26 DIAGNOSIS — Z98.890 OTHER SPECIFIED POSTPROCEDURAL STATES: Chronic | ICD-10-CM

## 2020-10-26 DIAGNOSIS — Z01.818 ENCOUNTER FOR OTHER PREPROCEDURAL EXAMINATION: ICD-10-CM

## 2020-10-26 DIAGNOSIS — I10 ESSENTIAL (PRIMARY) HYPERTENSION: ICD-10-CM

## 2020-10-26 DIAGNOSIS — Z96.641 PRESENCE OF RIGHT ARTIFICIAL HIP JOINT: Chronic | ICD-10-CM

## 2020-10-26 LAB
A1C WITH ESTIMATED AVERAGE GLUCOSE RESULT: 6.2 % — HIGH (ref 4–5.6)
ANION GAP SERPL CALC-SCNC: 5 MMOL/L — SIGNIFICANT CHANGE UP (ref 5–17)
APTT BLD: 28.3 SEC — SIGNIFICANT CHANGE UP (ref 27.5–35.5)
BASOPHILS # BLD AUTO: 0.07 K/UL — SIGNIFICANT CHANGE UP (ref 0–0.2)
BASOPHILS NFR BLD AUTO: 0.8 % — SIGNIFICANT CHANGE UP (ref 0–2)
BUN SERPL-MCNC: 19 MG/DL — SIGNIFICANT CHANGE UP (ref 7–23)
CALCIUM SERPL-MCNC: 8.7 MG/DL — SIGNIFICANT CHANGE UP (ref 8.5–10.1)
CHLORIDE SERPL-SCNC: 108 MMOL/L — SIGNIFICANT CHANGE UP (ref 96–108)
CO2 SERPL-SCNC: 28 MMOL/L — SIGNIFICANT CHANGE UP (ref 22–31)
CREAT SERPL-MCNC: 0.64 MG/DL — SIGNIFICANT CHANGE UP (ref 0.5–1.3)
EOSINOPHIL # BLD AUTO: 0.29 K/UL — SIGNIFICANT CHANGE UP (ref 0–0.5)
EOSINOPHIL NFR BLD AUTO: 3.2 % — SIGNIFICANT CHANGE UP (ref 0–6)
ESTIMATED AVERAGE GLUCOSE: 131 MG/DL — HIGH (ref 68–114)
GLUCOSE SERPL-MCNC: 105 MG/DL — HIGH (ref 70–99)
HCT VFR BLD CALC: 41.6 % — SIGNIFICANT CHANGE UP (ref 34.5–45)
HGB BLD-MCNC: 13.6 G/DL — SIGNIFICANT CHANGE UP (ref 11.5–15.5)
IMM GRANULOCYTES NFR BLD AUTO: 0.4 % — SIGNIFICANT CHANGE UP (ref 0–1.5)
INR BLD: 0.99 RATIO — SIGNIFICANT CHANGE UP (ref 0.88–1.16)
LYMPHOCYTES # BLD AUTO: 2.72 K/UL — SIGNIFICANT CHANGE UP (ref 1–3.3)
LYMPHOCYTES # BLD AUTO: 30.1 % — SIGNIFICANT CHANGE UP (ref 13–44)
MCHC RBC-ENTMCNC: 27.7 PG — SIGNIFICANT CHANGE UP (ref 27–34)
MCHC RBC-ENTMCNC: 32.7 GM/DL — SIGNIFICANT CHANGE UP (ref 32–36)
MCV RBC AUTO: 84.7 FL — SIGNIFICANT CHANGE UP (ref 80–100)
MONOCYTES # BLD AUTO: 0.74 K/UL — SIGNIFICANT CHANGE UP (ref 0–0.9)
MONOCYTES NFR BLD AUTO: 8.2 % — SIGNIFICANT CHANGE UP (ref 2–14)
MRSA PCR RESULT.: SIGNIFICANT CHANGE UP
NEUTROPHILS # BLD AUTO: 5.18 K/UL — SIGNIFICANT CHANGE UP (ref 1.8–7.4)
NEUTROPHILS NFR BLD AUTO: 57.3 % — SIGNIFICANT CHANGE UP (ref 43–77)
NRBC # BLD: 0 /100 WBCS — SIGNIFICANT CHANGE UP (ref 0–0)
PLATELET # BLD AUTO: 370 K/UL — SIGNIFICANT CHANGE UP (ref 150–400)
POTASSIUM SERPL-MCNC: 4.3 MMOL/L — SIGNIFICANT CHANGE UP (ref 3.5–5.3)
POTASSIUM SERPL-SCNC: 4.3 MMOL/L — SIGNIFICANT CHANGE UP (ref 3.5–5.3)
PROTHROM AB SERPL-ACNC: 11.5 SEC — SIGNIFICANT CHANGE UP (ref 10.6–13.6)
RBC # BLD: 4.91 M/UL — SIGNIFICANT CHANGE UP (ref 3.8–5.2)
RBC # FLD: 13.9 % — SIGNIFICANT CHANGE UP (ref 10.3–14.5)
S AUREUS DNA NOSE QL NAA+PROBE: DETECTED
SODIUM SERPL-SCNC: 141 MMOL/L — SIGNIFICANT CHANGE UP (ref 135–145)
WBC # BLD: 9.04 K/UL — SIGNIFICANT CHANGE UP (ref 3.8–10.5)
WBC # FLD AUTO: 9.04 K/UL — SIGNIFICANT CHANGE UP (ref 3.8–10.5)

## 2020-10-26 PROCEDURE — 93010 ELECTROCARDIOGRAM REPORT: CPT

## 2020-10-26 NOTE — H&P PST ADULT - NSICDXPROBLEM_GEN_ALL_CORE_FT
PROBLEM DIAGNOSES  Problem: Arthritis of shoulder region, left  Assessment and Plan: scheduled for left arthroplasty    Problem: Preoperative examination  Assessment and Plan: labs - cbc,pt/ptt,bmp,t&s,nose cx,ekg  M/C required  preop 3 day hibiclens instruction reviewed and given .instructed on if  nose cx positive use mupuricin 5 days and checklist given  take routine meds DOS with sips of water. avoid NSAID and OTC supplements. verbalized understanding  information on proper nutrition , increase protein and better food choices provided in packet      Problem: HTN (hypertension)  Assessment and Plan: continue meds       PROBLEM DIAGNOSES  Problem: Preoperative examination  Assessment and Plan: labs - cbc,pt/ptt,bmp,t&s,nose cx,ekg  M/C required  preop 3 day hibiclens instruction reviewed and given .instructed on if  nose cx positive use mupuricin 5 days and checklist given  take routine meds DOS with sips of water. avoid NSAID and OTC supplements. verbalized understanding  information on proper nutrition , increase protein and better food choices provided in packet      Problem: Arthritis of shoulder region, left  Assessment and Plan: scheduled for left arthroplasty    Problem: HTN (hypertension)  Assessment and Plan: continue meds

## 2020-10-26 NOTE — H&P PST ADULT - NSICDXPASTSURGICALHX_GEN_ALL_CORE_FT
PAST SURGICAL HISTORY:  H/O bilateral breast reduction surgery     H/O hand surgery left    H/O laminectomy     History of      History of tonsillectomy and adenoidectomy     History of total right hip replacement     S/P appendectomy     S/P hip arthroscopy

## 2020-10-26 NOTE — H&P PST ADULT - HISTORY OF PRESENT ILLNESS
81 yo female c/o bilateral shoulder pains - left is worse secondary to arthritis - scheduled for left shoulder arthroplasty    denies recent travels in the past 30 days. No fever, SOB, cough, flu like symptoms or body rash- covid screen

## 2020-10-26 NOTE — CONSULT NOTE PEDS - SUBJECTIVE AND OBJECTIVE BOX
Request for refill on Qvar LILLIAN DENIS is a 79y Female s/p LEFT TOTAL HIP ARTHROPLASTY  LEFT TOTAL HIP REPLACEMENT by Dr. Hayward on 6/3/20. complains of postop pain; patient tolerated surgery well.    PMH: w/ h/o HLD (hyperlipidemia)  HTN (hypertension)    ROS: no fevers, chills, headache, dizziness, lightheadedness, chest pain, palpitations, shortness of breath, cough, phlegm, wheezing, abdominal pain, nausea, vomiting, diarrhea, constipation or urinary symptoms     PSH: History of total right hip replacement  H/O bilateral breast reduction surgery  H/O hand surgery  History of tonsillectomy and adenoidectomy  H/O laminectomy  S/P appendectomy  History of     SH: does not smoke or drink at this time    No Known Allergies    MEDS: acetaminophen   Tablet .. 650 milliGRAM(s) Oral every 6 hours  aluminum hydroxide/magnesium hydroxide/simethicone Suspension 30 milliLiter(s) Oral four times a day PRN  ascorbic acid 500 milliGRAM(s) Oral two times a day  atorvastatin 20 milliGRAM(s) Oral at bedtime  ceFAZolin   IVPB 2000 milliGRAM(s) IV Intermittent every 8 hours  lactated ringers. 1000 milliLiter(s) IV Continuous <Continuous>  losartan 100 milliGRAM(s) Oral daily  magnesium hydroxide Suspension 30 milliLiter(s) Oral daily PRN  morphine  - Injectable 4 milliGRAM(s) IV Push once  multivitamin 1 Tablet(s) Oral daily  ondansetron Injectable 4 milliGRAM(s) IV Push every 6 hours PRN  oxyCODONE    IR 5 milliGRAM(s) Oral every 4 hours PRN  oxyCODONE    IR 10 milliGRAM(s) Oral every 4 hours PRN  pantoprazole    Tablet 40 milliGRAM(s) Oral before breakfast  polyethylene glycol 3350 17 Gram(s) Oral daily  senna 2 Tablet(s) Oral at bedtime PRN    PHYS: T(C): 36.4 (20 @ 16:17), Max: 36.7 (20 @ 14:15)  HR: 97 (20 @ 18:30) (71 - 97)  BP: 112/54 (20 @ 18:30) (112/54 - 176/66)  RR: 18 (06-03-20 @ 18:30) (16 - 20)  SpO2: 97% (20 @ 18:30) (96% - 100%)  HEENT unremarkable  neck no JVD or bruit  lungs, clear bilaterally  heart, regular rhythm, normal S1, S2, no murmurs, rubs or gallops  abdomen, soft, non tender, no organomegaly, normal bowel sounds  no cyanosis, clubbing, edema or calf tenderness  neuro, grossly normal                        12.2   10.37 )-----------( 286      ( 2020 12:44 )             36.0     Assessment and Plan: status post left total hip replacement; Hypertension; hls; pre diabetes mellitus (A1c=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

## 2020-10-26 NOTE — H&P PST ADULT - ASSESSMENT
left shoulder arthritis   CAPRINI SCORE    AGE RELATED RISK FACTORS                                                       MOBILITY RELATED FACTORS  [ ] Age 41-60 years                                            (1 Point)                  [ ] Bed rest                                                        (1 Point)  [ ] Age: 61-74 years                                           (2 Points)                [ ] Plaster cast                                                   (2 Points)  [x ] Age= 75 years                                              (3 Points)                 [ ] Bed bound for more than 72 hours                   (2 Points)    DISEASE RELATED RISK FACTORS                                               GENDER SPECIFIC FACTORS  [ ] Edema in the lower extremities                       (1 Point)                  [ ] Pregnancy                                                     (1 Point)  [ ] Varicose veins                                               (1 Point)                  [ ] Post-partum < 6 weeks                                   (1 Point)             [ x] BMI > 25 Kg/m2                                            (1 Point)                  [ ] Hormonal therapy  or oral contraception            (1 Point)                 [ ] Sepsis (in the previous month)                        (1 Point)                  [ ] History of pregnancy complications  [ ] Pneumonia or serious lung disease                                               [ ] Unexplained or recurrent                       (1 Point)           (in the previous month)                               (1 Point)  [ ] Abnormal pulmonary function test                     (1 Point)                 SURGERY RELATED RISK FACTORS  [ ] Acute myocardial infarction                              (1 Point)                 [ ]  Section                                            (1 Point)  [ ] Congestive heart failure (in the previous month)  (1 Point)                 [ ] Minor surgery                                                 (1 Point)   [ ] Inflammatory bowel disease                             (1 Point)                 [ ] Arthroscopic surgery                                        (2 Points)  [ ] Central venous access                                    (2 Points)                [ ] General surgery lasting more than 45 minutes   (2 Points)       [ ] Stroke (in the previous month)                          (5 Points)               [x ] Elective arthroplasty                                        (5 Points)                                                                                                                                               HEMATOLOGY RELATED FACTORS                                                 TRAUMA RELATED RISK FACTORS  [ ] Prior episodes of VTE                                     (3 Points)                 [ ] Fracture of the hip, pelvis, or leg                       (5 Points)  [ ] Positive family history for VTE                         (3 Points)                 [ ] Acute spinal cord injury (in the previous month)  (5 Points)  [ ] Prothrombin 01739 A                                      (3 Points)                 [ ] Paralysis  (less than 1 month)                          (5 Points)  [ ] Factor V Leiden                                             (3 Points)                 [ ] Multiple Trauma within 1 month                         (5 Points)  [ ] Lupus anticoagulants                                     (3 Points)                                                           [ ] Anticardiolipin antibodies                                (3 Points)                                                       [ ] High homocysteine in the blood                      (3 Points)                                             [ ] Other congenital or acquired thrombophilia       (3 Points)                                                [ ] Heparin induced thrombocytopenia                  (3 Points)                                          Total Score [     9     ]  Caprini Score 0-2: Low risk, No VTE Prophylaxis required for most patient's, encourage ambulation  Caprini Score 3-6: At Risk, Pharmacologic VTE prophylaxis is indicated for most patients ( in the absence of a contraindication)  Caprini Score Greater than or = 7: High Risk , pharmacologic VTE is indicated for most patients ( in the absence of a contraindication)    Caprini score indicates that the patient is high risk for VTE event ( score 6 or greater). Surgical patient's in this group will benefit from both pharmacologic prophylaxis and intermittent compression devices . Surgical team will determine the balance between VTE  risk and bleeding risk and other clinical considerations

## 2020-10-27 RX ORDER — MUPIROCIN 20 MG/G
1 OINTMENT TOPICAL
Qty: 10 | Refills: 0
Start: 2020-10-27 | End: 2020-10-31

## 2020-10-29 DIAGNOSIS — Z01.818 ENCOUNTER FOR OTHER PREPROCEDURAL EXAMINATION: ICD-10-CM

## 2020-10-30 ENCOUNTER — APPOINTMENT (OUTPATIENT)
Dept: DISASTER EMERGENCY | Facility: CLINIC | Age: 80
End: 2020-10-30

## 2020-10-31 LAB — SARS-COV-2 N GENE NPH QL NAA+PROBE: NOT DETECTED

## 2020-11-01 ENCOUNTER — TRANSCRIPTION ENCOUNTER (OUTPATIENT)
Age: 80
End: 2020-11-01

## 2020-11-02 ENCOUNTER — INPATIENT (INPATIENT)
Facility: HOSPITAL | Age: 80
LOS: 0 days | Discharge: HOME | End: 2020-11-03
Attending: ORTHOPAEDIC SURGERY | Admitting: ORTHOPAEDIC SURGERY
Payer: MEDICARE

## 2020-11-02 ENCOUNTER — TRANSCRIPTION ENCOUNTER (OUTPATIENT)
Age: 80
End: 2020-11-02

## 2020-11-02 ENCOUNTER — RESULT REVIEW (OUTPATIENT)
Age: 80
End: 2020-11-02

## 2020-11-02 VITALS
HEIGHT: 68 IN | OXYGEN SATURATION: 98 % | SYSTOLIC BLOOD PRESSURE: 133 MMHG | DIASTOLIC BLOOD PRESSURE: 64 MMHG | RESPIRATION RATE: 17 BRPM | TEMPERATURE: 98 F | HEART RATE: 79 BPM | WEIGHT: 169.09 LBS

## 2020-11-02 DIAGNOSIS — I10 ESSENTIAL (PRIMARY) HYPERTENSION: ICD-10-CM

## 2020-11-02 DIAGNOSIS — Z01.818 ENCOUNTER FOR OTHER PREPROCEDURAL EXAMINATION: ICD-10-CM

## 2020-11-02 DIAGNOSIS — Z98.891 HISTORY OF UTERINE SCAR FROM PREVIOUS SURGERY: Chronic | ICD-10-CM

## 2020-11-02 DIAGNOSIS — Z96.641 PRESENCE OF RIGHT ARTIFICIAL HIP JOINT: Chronic | ICD-10-CM

## 2020-11-02 DIAGNOSIS — Z98.890 OTHER SPECIFIED POSTPROCEDURAL STATES: Chronic | ICD-10-CM

## 2020-11-02 DIAGNOSIS — M25.512 PAIN IN LEFT SHOULDER: ICD-10-CM

## 2020-11-02 DIAGNOSIS — Z90.49 ACQUIRED ABSENCE OF OTHER SPECIFIED PARTS OF DIGESTIVE TRACT: Chronic | ICD-10-CM

## 2020-11-02 PROCEDURE — 88311 DECALCIFY TISSUE: CPT | Mod: 26

## 2020-11-02 PROCEDURE — 88309 TISSUE EXAM BY PATHOLOGIST: CPT | Mod: 26

## 2020-11-02 RX ORDER — ASCORBIC ACID 60 MG
500 TABLET,CHEWABLE ORAL DAILY
Refills: 0 | Status: DISCONTINUED | OUTPATIENT
Start: 2020-11-02 | End: 2020-11-03

## 2020-11-02 RX ORDER — SENNA PLUS 8.6 MG/1
2 TABLET ORAL AT BEDTIME
Refills: 0 | Status: DISCONTINUED | OUTPATIENT
Start: 2020-11-02 | End: 2020-11-02

## 2020-11-02 RX ORDER — CEFAZOLIN SODIUM 1 G
2000 VIAL (EA) INJECTION EVERY 8 HOURS
Refills: 0 | Status: COMPLETED | OUTPATIENT
Start: 2020-11-02 | End: 2020-11-03

## 2020-11-02 RX ORDER — ACETAMINOPHEN 500 MG
1000 TABLET ORAL ONCE
Refills: 0 | Status: COMPLETED | OUTPATIENT
Start: 2020-11-02 | End: 2020-11-02

## 2020-11-02 RX ORDER — BENZOCAINE AND MENTHOL 5; 1 G/100ML; G/100ML
1 LIQUID ORAL THREE TIMES A DAY
Refills: 0 | Status: DISCONTINUED | OUTPATIENT
Start: 2020-11-02 | End: 2020-11-03

## 2020-11-02 RX ORDER — OXYCODONE HYDROCHLORIDE 5 MG/1
5 TABLET ORAL EVERY 6 HOURS
Refills: 0 | Status: DISCONTINUED | OUTPATIENT
Start: 2020-11-02 | End: 2020-11-02

## 2020-11-02 RX ORDER — OXYCODONE HYDROCHLORIDE 5 MG/1
5 TABLET ORAL EVERY 4 HOURS
Refills: 0 | Status: DISCONTINUED | OUTPATIENT
Start: 2020-11-02 | End: 2020-11-03

## 2020-11-02 RX ORDER — ATORVASTATIN CALCIUM 80 MG/1
20 TABLET, FILM COATED ORAL AT BEDTIME
Refills: 0 | Status: DISCONTINUED | OUTPATIENT
Start: 2020-11-02 | End: 2020-11-03

## 2020-11-02 RX ORDER — FENTANYL CITRATE 50 UG/ML
25 INJECTION INTRAVENOUS
Refills: 0 | Status: DISCONTINUED | OUTPATIENT
Start: 2020-11-02 | End: 2020-11-02

## 2020-11-02 RX ORDER — TRANEXAMIC ACID 100 MG/ML
1000 INJECTION, SOLUTION INTRAVENOUS ONCE
Refills: 0 | Status: COMPLETED | OUTPATIENT
Start: 2020-11-02 | End: 2020-11-02

## 2020-11-02 RX ORDER — OXYCODONE HYDROCHLORIDE 5 MG/1
10 TABLET ORAL EVERY 6 HOURS
Refills: 0 | Status: DISCONTINUED | OUTPATIENT
Start: 2020-11-02 | End: 2020-11-03

## 2020-11-02 RX ORDER — LOSARTAN POTASSIUM 100 MG/1
100 TABLET, FILM COATED ORAL DAILY
Refills: 0 | Status: DISCONTINUED | OUTPATIENT
Start: 2020-11-02 | End: 2020-11-03

## 2020-11-02 RX ORDER — PANTOPRAZOLE SODIUM 20 MG/1
40 TABLET, DELAYED RELEASE ORAL
Refills: 0 | Status: DISCONTINUED | OUTPATIENT
Start: 2020-11-02 | End: 2020-11-03

## 2020-11-02 RX ORDER — SODIUM CHLORIDE 9 MG/ML
1000 INJECTION, SOLUTION INTRAVENOUS
Refills: 0 | Status: DISCONTINUED | OUTPATIENT
Start: 2020-11-02 | End: 2020-11-02

## 2020-11-02 RX ORDER — ONDANSETRON 8 MG/1
4 TABLET, FILM COATED ORAL EVERY 6 HOURS
Refills: 0 | Status: DISCONTINUED | OUTPATIENT
Start: 2020-11-02 | End: 2020-11-03

## 2020-11-02 RX ORDER — KETOROLAC TROMETHAMINE 30 MG/ML
30 SYRINGE (ML) INJECTION EVERY 8 HOURS
Refills: 0 | Status: DISCONTINUED | OUTPATIENT
Start: 2020-11-02 | End: 2020-11-03

## 2020-11-02 RX ORDER — SENNA PLUS 8.6 MG/1
2 TABLET ORAL AT BEDTIME
Refills: 0 | Status: DISCONTINUED | OUTPATIENT
Start: 2020-11-02 | End: 2020-11-03

## 2020-11-02 RX ORDER — PREGABALIN 225 MG/1
1000 CAPSULE ORAL DAILY
Refills: 0 | Status: DISCONTINUED | OUTPATIENT
Start: 2020-11-02 | End: 2020-11-03

## 2020-11-02 RX ORDER — SODIUM CHLORIDE 9 MG/ML
3 INJECTION INTRAMUSCULAR; INTRAVENOUS; SUBCUTANEOUS EVERY 8 HOURS
Refills: 0 | Status: DISCONTINUED | OUTPATIENT
Start: 2020-11-02 | End: 2020-11-02

## 2020-11-02 RX ORDER — CELECOXIB 200 MG/1
200 CAPSULE ORAL
Refills: 0 | Status: DISCONTINUED | OUTPATIENT
Start: 2020-11-02 | End: 2020-11-02

## 2020-11-02 RX ORDER — OXYCODONE HYDROCHLORIDE 5 MG/1
10 TABLET ORAL EVERY 6 HOURS
Refills: 0 | Status: DISCONTINUED | OUTPATIENT
Start: 2020-11-02 | End: 2020-11-02

## 2020-11-02 RX ORDER — ASPIRIN/CALCIUM CARB/MAGNESIUM 324 MG
325 TABLET ORAL DAILY
Refills: 0 | Status: DISCONTINUED | OUTPATIENT
Start: 2020-11-03 | End: 2020-11-03

## 2020-11-02 RX ORDER — HYDROMORPHONE HYDROCHLORIDE 2 MG/ML
0.5 INJECTION INTRAMUSCULAR; INTRAVENOUS; SUBCUTANEOUS
Refills: 0 | Status: DISCONTINUED | OUTPATIENT
Start: 2020-11-02 | End: 2020-11-02

## 2020-11-02 RX ORDER — SODIUM CHLORIDE 9 MG/ML
1000 INJECTION INTRAMUSCULAR; INTRAVENOUS; SUBCUTANEOUS
Refills: 0 | Status: DISCONTINUED | OUTPATIENT
Start: 2020-11-02 | End: 2020-11-03

## 2020-11-02 RX ORDER — ALENDRONATE SODIUM 70 MG/1
1 TABLET ORAL
Qty: 0 | Refills: 0 | DISCHARGE

## 2020-11-02 RX ADMIN — OXYCODONE HYDROCHLORIDE 10 MILLIGRAM(S): 5 TABLET ORAL at 18:00

## 2020-11-02 RX ADMIN — SODIUM CHLORIDE 100 MILLILITER(S): 9 INJECTION, SOLUTION INTRAVENOUS at 12:27

## 2020-11-02 RX ADMIN — HYDROMORPHONE HYDROCHLORIDE 0.5 MILLIGRAM(S): 2 INJECTION INTRAMUSCULAR; INTRAVENOUS; SUBCUTANEOUS at 12:27

## 2020-11-02 RX ADMIN — TRANEXAMIC ACID 220 MILLIGRAM(S): 100 INJECTION, SOLUTION INTRAVENOUS at 13:04

## 2020-11-02 RX ADMIN — SENNA PLUS 2 TABLET(S): 8.6 TABLET ORAL at 21:25

## 2020-11-02 RX ADMIN — Medication 30 MILLIGRAM(S): at 21:45

## 2020-11-02 RX ADMIN — HYDROMORPHONE HYDROCHLORIDE 0.5 MILLIGRAM(S): 2 INJECTION INTRAMUSCULAR; INTRAVENOUS; SUBCUTANEOUS at 12:37

## 2020-11-02 RX ADMIN — OXYCODONE HYDROCHLORIDE 10 MILLIGRAM(S): 5 TABLET ORAL at 17:22

## 2020-11-02 RX ADMIN — HYDROMORPHONE HYDROCHLORIDE 0.5 MILLIGRAM(S): 2 INJECTION INTRAMUSCULAR; INTRAVENOUS; SUBCUTANEOUS at 12:47

## 2020-11-02 RX ADMIN — OXYCODONE HYDROCHLORIDE 10 MILLIGRAM(S): 5 TABLET ORAL at 13:14

## 2020-11-02 RX ADMIN — Medication 30 MILLIGRAM(S): at 21:24

## 2020-11-02 RX ADMIN — HYDROMORPHONE HYDROCHLORIDE 0.5 MILLIGRAM(S): 2 INJECTION INTRAMUSCULAR; INTRAVENOUS; SUBCUTANEOUS at 12:32

## 2020-11-02 RX ADMIN — Medication 100 MILLIGRAM(S): at 17:30

## 2020-11-02 RX ADMIN — BENZOCAINE AND MENTHOL 1 LOZENGE: 5; 1 LIQUID ORAL at 18:00

## 2020-11-02 RX ADMIN — Medication 1000 MILLIGRAM(S): at 12:22

## 2020-11-02 RX ADMIN — LOSARTAN POTASSIUM 100 MILLIGRAM(S): 100 TABLET, FILM COATED ORAL at 21:25

## 2020-11-02 RX ADMIN — SODIUM CHLORIDE 75 MILLILITER(S): 9 INJECTION INTRAMUSCULAR; INTRAVENOUS; SUBCUTANEOUS at 14:54

## 2020-11-02 RX ADMIN — ATORVASTATIN CALCIUM 20 MILLIGRAM(S): 80 TABLET, FILM COATED ORAL at 21:24

## 2020-11-02 RX ADMIN — Medication 400 MILLIGRAM(S): at 12:27

## 2020-11-02 RX ADMIN — Medication 400 MILLIGRAM(S): at 23:26

## 2020-11-02 NOTE — OCCUPATIONAL THERAPY INITIAL EVALUATION ADULT - RANGE OF MOTION EXAMINATION, UPPER EXTREMITY
LUE: shoulder ROM not assessed secondary to MD orders of no L shoulder ROM, elbow flexion/ extension WFL, wrist flexion/ extension WFL, digit flexion/ extension WFL/Right UE Active ROM was WFL (within functional limits)

## 2020-11-02 NOTE — DISCHARGE NOTE PROVIDER - NSDCFUADDINST_GEN_ALL_CORE_FT
Keep Prineo Dressing Clean, Dry and Intact. May shower with Prineo Dressing. Please do not scrub, soak, peel or pick at the prineo dressing. No creams, lotions, or oils over dressing. May shower and let water run over incision, no baths. Pat dry once out of shower. Dressing to be removed in office at follow up visit in 2 weeks. There are no staples or stitches that need to be removed.    Sling/Non weight bearing on the operative shoulder. No range of motion to operative shoulder. It is ok to move the elbow/wrist/fingers.     If you have a new onset of numbness or tingling in your arm or hand that was not present before surgery that lasts longer than 24 hours call your surgeon.

## 2020-11-02 NOTE — PHYSICAL THERAPY INITIAL EVALUATION ADULT - RANGE OF MOTION EXAMINATION, REHAB EVAL
Right LE ROM was WNL (within normal limits)/bilateral lower extremity was ROM was WNL (within normal limits)

## 2020-11-02 NOTE — DISCHARGE NOTE PROVIDER - NSDCCPTREATMENT_GEN_ALL_CORE_FT
PRINCIPAL PROCEDURE  Procedure: Reverse total shoulder replacement  Findings and Treatment: left

## 2020-11-02 NOTE — DISCHARGE NOTE PROVIDER - CARE PROVIDER_API CALL
Earl Esquivel  ORTHOPAEDIC SURGERY  [address] [place] [address]  Phone: [phone]  Fax: [phone]  Follow Up Time:

## 2020-11-02 NOTE — DISCHARGE NOTE PROVIDER - NSDCCPCAREPLAN_GEN_ALL_CORE_FT
PRINCIPAL DISCHARGE DIAGNOSIS  Diagnosis: Left shoulder pain  Assessment and Plan of Treatment:

## 2020-11-02 NOTE — OCCUPATIONAL THERAPY INITIAL EVALUATION ADULT - PRECAUTIONS/LIMITATIONS, REHAB EVAL
surgical precautions/fall precautions/reverse total shoulder arthroplasty precautions- no shoulder ROM and NWB SARA

## 2020-11-02 NOTE — OCCUPATIONAL THERAPY INITIAL EVALUATION ADULT - ADDITIONAL COMMENTS
Pt reports she lives with her  in a  with 4-5 CATARINO with bilateral hand rails. Pt's bedroom and bathroom are on the first floor. Bathroom contains a tub shower combination and standard height toilet. Pt reports she was independent with ADL's and functional transfers/ ambulation. Pt reports she has a commode at home. Pt reports her  can assist her upon discharge.

## 2020-11-02 NOTE — DISCHARGE NOTE PROVIDER - NSDCMRMEDTOKEN_GEN_ALL_CORE_FT
ascorbic acid 500 mg oral tablet: 1 tab(s) orally 2 times a day  aspirin 325 mg oral delayed release tablet: 1 tab(s) orally 2 times a day MDD:2 Tabs  atorvastatin: 20 milligram(s) orally once a day  Caltrate 600 + D oral tablet: 1 tab(s) orally 2 times a day  folic acid 0.4 mg oral tablet: 2 tab(s) orally once a day  Glucosamine Chondroitin oral capsule: 3 cap(s) orally once a day  losartan: 100 milligram(s) orally once a day  Multiple Vitamins oral tablet: 1 tab(s) orally once a day  mupirocin 2% topical ointment: Apply topically to affected area 2 times a day MDD:2 intra nasally  senna oral tablet: 2 tab(s) orally once a day (at bedtime), As needed, Constipation  Vitamin B-12 1000 mcg oral tablet: 1 tab(s) orally once a day   ascorbic acid 500 mg oral tablet: 1 tab(s) orally 2 times a day  aspirin 325 mg oral delayed release tablet: 1 tab(s) orally once a day MDD:1  atorvastatin: 20 milligram(s) orally once a day  Caltrate 600 + D oral tablet: 1 tab(s) orally 2 times a day  folic acid 0.4 mg oral tablet: 2 tab(s) orally once a day  losartan: 100 milligram(s) orally once a day  Multiple Vitamins oral tablet: 1 tab(s) orally once a day  oxyCODONE 5 mg oral tablet: 1- 2 tab(s) orally every 4 hours, As Needed -pain  MDD:10  pantoprazole 40 mg oral delayed release tablet: 1 tab(s) orally once a day (before a meal) MDD:1  senna oral tablet: 2 tab(s) orally once a day (at bedtime), As needed, Constipation  Vitamin B-12 1000 mcg oral tablet: 1 tab(s) orally once a day

## 2020-11-02 NOTE — DISCHARGE NOTE PROVIDER - NSDCFUADDAPPT_GEN_ALL_CORE_FT
Follow up with your surgeon in two weeks. Call for appointment.  If you need more pain medication, call your surgeon's office. For medication refills or authorizations, please call [phone]  We recommend that you call and schedule a follow up appointment with your primary care physician for repeat blood work (CBC and BMP) for post hospital discharge follow-up care 2-4 weeks after your surgery.   Call your surgeon if you have increased redness/pain/drainage or fever. Return to ER for shortness of breath/calf tenderness.

## 2020-11-02 NOTE — OCCUPATIONAL THERAPY INITIAL EVALUATION ADULT - GENERAL OBSERVATIONS, REHAB EVAL
Chart reviewed and events to date noted. OT evaluation completed. Encountered Pt semi supine in bed, NAD, with IV hep lock intact to R forearm and sling to LUE s/p L reverse total shoulder arthroplasty POD 0 + NWB LUE and reverse total shoulder arthroplasty precautions, which Pt verbalized understanding.

## 2020-11-02 NOTE — PHYSICAL THERAPY INITIAL EVALUATION ADULT - ACTIVE RANGE OF MOTION EXAMINATION, REHAB EVAL
RLE Active ROM was WNL (within normal limits)/bilateral lower extremity Active ROM was WNL (within normal limits)

## 2020-11-02 NOTE — PROGRESS NOTE ADULT - SUBJECTIVE AND OBJECTIVE BOX
80yFemale s/p left reverse TSA POD#0. Pt seen and examined in NAD. Pain controlled. Pt denies any new complaints. Pt denies CP/SOB/N/V/D/numbness/tingling/bowel or bladder dysfunction.     PE:   Neuro: AAOX3  RUE: Skin intact. +ROM shoulder/elbow/wrist/fingers. +ok/thumbsup/fingercross signs.  strength: 5/5.  RP2+ NVI.   LUE: Prineo dressing C/D/I. Sling in place.  +ROM elbow/wrist/fingers. +ok/thumbsup/fingercross signs.  strength: 5/5.  RP2+ NVI.   B/L LE: Skin intact. +ROM hip/knee/ankle/toes. Ankle Dorsi/plantarflexion: 5/5. Calf: soft, compressible and nontender. DP/PT 2+ NVI.                     A/P: 80yFemale s/p left reverse TSA POD#0.  F/U AM post op Xrayi  Pain controlled  PT/OT: NWB LUE. No ROM LUE   DVT ppx: SCDs, ASA 325mg daily  Wound care, Isometric exercises, incentive spirometry   Medical consult appreciated  Discharge: planning home tomorrow  Pt seen with DR Esquivel.   All the above discussed and understood by pt

## 2020-11-02 NOTE — CONSULT NOTE ADULT - SUBJECTIVE AND OBJECTIVE BOX
LILLIAN DENIS is a 80y Female s/p LEFT SHOULDER ARTHROPLASTY VS REVERSE SHOULDER ARTHROPLASTY      w/ h/o HLD (hyperlipidemia)    HTN (hypertension)      denies any chest pain shortness of breath palpitation dizziness lightheadedness nausea vomiting fever or chills  HTN (hypertension)    Preoperative examination    Left shoulder pain      S/P hip arthroscopy    History of total right hip replacement    H/O bilateral breast reduction surgery    H/O hand surgery    History of tonsillectomy and adenoidectomy    H/O laminectomy    S/P appendectomy    History of         SH: doesnot smoke or drink at this time    No Known Allergies    acetaminophen  IVPB .. 1000 milliGRAM(s) IV Intermittent once PRN  ascorbic acid 500 milliGRAM(s) Oral daily  aspirin enteric coated 325 milliGRAM(s) Oral daily  atorvastatin 20 milliGRAM(s) Oral at bedtime  benzocaine 15 mG/menthol 3.6 mG (Sugar-Free) Lozenge 1 Lozenge Oral three times a day  ceFAZolin   IVPB 2000 milliGRAM(s) IV Intermittent every 8 hours  cyanocobalamin 1000 MICROGram(s) Oral daily  ketorolac   Injectable 30 milliGRAM(s) IV Push every 8 hours PRN  losartan 100 milliGRAM(s) Oral daily  multivitamin 1 Tablet(s) Oral daily  ondansetron Injectable 4 milliGRAM(s) IV Push every 6 hours PRN  oxyCODONE    IR 10 milliGRAM(s) Oral every 6 hours PRN  oxyCODONE    IR 5 milliGRAM(s) Oral every 4 hours PRN  pantoprazole    Tablet 40 milliGRAM(s) Oral before breakfast  senna 2 Tablet(s) Oral at bedtime PRN  sodium chloride 0.9%. 1000 milliLiter(s) IV Continuous <Continuous>    T(C): 36.6 (20 @ 16:48), Max: 36.7 (20 @ 08:54)  HR: 73 (20 @ 16:48) (65 - 81)  BP: 121/55 (20 @ 16:48) (121/55 - 152/66)  RR: 19 (20 @ 16:48) (16 - 22)  SpO2: 94% (20 @ 16:48) (94% - 98%)  HEENT unremarkable  neck no JVD or bruit  heart normal S1 S2 RRR no gallops or rubs  chest clear to auscultation  abd sof nontender non distended +bs  ext no calf tenderness    A/P   DVT PX  pain control  bowel regimen   wound care as per ortho  GI PX  antiemetics prn  incentive spirometer

## 2020-11-02 NOTE — DISCHARGE NOTE PROVIDER - HOSPITAL COURSE
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 Risk and benefits of surgery were explained to the patient. The patient understood and agreed to proceed with surgery. Patient underwent the procedure with no intraoperative complications. Pt was brought in stable condition to the PACU. Once stable in PACU, pt was brought to the floor. During hospital stay pt was followed by Medicine,  during this admission. Pt had an uneventful hospital course. Pt is stable for discharge to [rehab/home] on POD# 80yFemale with history of  osteoarthritis of left shoulder   presenting for left reverse TSA by Dr Earl Esquivel on  11/2/2020. Risk and benefits of surgery were explained to the patient. The patient understood and agreed to proceed with surgery. Patient underwent the procedure with no intraoperative complications. Pt was brought in stable condition to the PACU. Once stable in PACU, pt was brought to the floor. During hospital stay pt was followed by Medicine,  during this admission. Pt had an uneventful hospital course. Pt is stable for discharge to home on POD# 1

## 2020-11-03 ENCOUNTER — TRANSCRIPTION ENCOUNTER (OUTPATIENT)
Age: 80
End: 2020-11-03

## 2020-11-03 VITALS
HEART RATE: 90 BPM | OXYGEN SATURATION: 95 % | RESPIRATION RATE: 16 BRPM | DIASTOLIC BLOOD PRESSURE: 67 MMHG | TEMPERATURE: 98 F | SYSTOLIC BLOOD PRESSURE: 143 MMHG

## 2020-11-03 LAB
ALBUMIN SERPL ELPH-MCNC: 2.7 G/DL — LOW (ref 3.3–5)
ALP SERPL-CCNC: 61 U/L — SIGNIFICANT CHANGE UP (ref 40–120)
ALT FLD-CCNC: 17 U/L — SIGNIFICANT CHANGE UP (ref 12–78)
ANION GAP SERPL CALC-SCNC: 10 MMOL/L — SIGNIFICANT CHANGE UP (ref 5–17)
AST SERPL-CCNC: 18 U/L — SIGNIFICANT CHANGE UP (ref 15–37)
BASOPHILS # BLD AUTO: 0.02 K/UL — SIGNIFICANT CHANGE UP (ref 0–0.2)
BASOPHILS NFR BLD AUTO: 0.2 % — SIGNIFICANT CHANGE UP (ref 0–2)
BILIRUB SERPL-MCNC: 0.3 MG/DL — SIGNIFICANT CHANGE UP (ref 0.2–1.2)
BUN SERPL-MCNC: 17 MG/DL — SIGNIFICANT CHANGE UP (ref 7–23)
CALCIUM SERPL-MCNC: 8.1 MG/DL — LOW (ref 8.5–10.1)
CHLORIDE SERPL-SCNC: 106 MMOL/L — SIGNIFICANT CHANGE UP (ref 96–108)
CO2 SERPL-SCNC: 22 MMOL/L — SIGNIFICANT CHANGE UP (ref 22–31)
CREAT SERPL-MCNC: 0.69 MG/DL — SIGNIFICANT CHANGE UP (ref 0.5–1.3)
EOSINOPHIL # BLD AUTO: 0.03 K/UL — SIGNIFICANT CHANGE UP (ref 0–0.5)
EOSINOPHIL NFR BLD AUTO: 0.3 % — SIGNIFICANT CHANGE UP (ref 0–6)
GLUCOSE SERPL-MCNC: 112 MG/DL — HIGH (ref 70–99)
HCT VFR BLD CALC: 31.2 % — LOW (ref 34.5–45)
HGB BLD-MCNC: 10.4 G/DL — LOW (ref 11.5–15.5)
IMM GRANULOCYTES NFR BLD AUTO: 0.3 % — SIGNIFICANT CHANGE UP (ref 0–1.5)
LYMPHOCYTES # BLD AUTO: 2.41 K/UL — SIGNIFICANT CHANGE UP (ref 1–3.3)
LYMPHOCYTES # BLD AUTO: 20.1 % — SIGNIFICANT CHANGE UP (ref 13–44)
MCHC RBC-ENTMCNC: 28.2 PG — SIGNIFICANT CHANGE UP (ref 27–34)
MCHC RBC-ENTMCNC: 33.3 GM/DL — SIGNIFICANT CHANGE UP (ref 32–36)
MCV RBC AUTO: 84.6 FL — SIGNIFICANT CHANGE UP (ref 80–100)
MONOCYTES # BLD AUTO: 1.42 K/UL — HIGH (ref 0–0.9)
MONOCYTES NFR BLD AUTO: 11.9 % — SIGNIFICANT CHANGE UP (ref 2–14)
NEUTROPHILS # BLD AUTO: 8.06 K/UL — HIGH (ref 1.8–7.4)
NEUTROPHILS NFR BLD AUTO: 67.2 % — SIGNIFICANT CHANGE UP (ref 43–77)
NRBC # BLD: 0 /100 WBCS — SIGNIFICANT CHANGE UP (ref 0–0)
PLATELET # BLD AUTO: 277 K/UL — SIGNIFICANT CHANGE UP (ref 150–400)
POTASSIUM SERPL-MCNC: 3.5 MMOL/L — SIGNIFICANT CHANGE UP (ref 3.5–5.3)
POTASSIUM SERPL-SCNC: 3.5 MMOL/L — SIGNIFICANT CHANGE UP (ref 3.5–5.3)
PROT SERPL-MCNC: 5.8 GM/DL — LOW (ref 6–8.3)
RBC # BLD: 3.69 M/UL — LOW (ref 3.8–5.2)
RBC # FLD: 14 % — SIGNIFICANT CHANGE UP (ref 10.3–14.5)
SODIUM SERPL-SCNC: 138 MMOL/L — SIGNIFICANT CHANGE UP (ref 135–145)
WBC # BLD: 11.97 K/UL — HIGH (ref 3.8–10.5)
WBC # FLD AUTO: 11.97 K/UL — HIGH (ref 3.8–10.5)

## 2020-11-03 PROCEDURE — 73030 X-RAY EXAM OF SHOULDER: CPT | Mod: 26,LT

## 2020-11-03 RX ORDER — GLUCOSAMINE/CHONDROITIN/C/MANG 500-400 MG
3 CAPSULE ORAL
Qty: 0 | Refills: 0 | DISCHARGE

## 2020-11-03 RX ORDER — OXYCODONE HYDROCHLORIDE 5 MG/1
2 TABLET ORAL
Qty: 50 | Refills: 0
Start: 2020-11-03 | End: 2020-11-07

## 2020-11-03 RX ORDER — PANTOPRAZOLE SODIUM 20 MG/1
1 TABLET, DELAYED RELEASE ORAL
Qty: 30 | Refills: 0
Start: 2020-11-03 | End: 2020-12-02

## 2020-11-03 RX ORDER — ASPIRIN/CALCIUM CARB/MAGNESIUM 324 MG
1 TABLET ORAL
Qty: 14 | Refills: 0
Start: 2020-11-03 | End: 2020-11-16

## 2020-11-03 RX ORDER — OXYCODONE HYDROCHLORIDE 5 MG/1
10 TABLET ORAL EVERY 4 HOURS
Refills: 0 | Status: DISCONTINUED | OUTPATIENT
Start: 2020-11-03 | End: 2020-11-03

## 2020-11-03 RX ADMIN — OXYCODONE HYDROCHLORIDE 10 MILLIGRAM(S): 5 TABLET ORAL at 13:19

## 2020-11-03 RX ADMIN — Medication 1000 MILLIGRAM(S): at 00:16

## 2020-11-03 RX ADMIN — OXYCODONE HYDROCHLORIDE 10 MILLIGRAM(S): 5 TABLET ORAL at 00:15

## 2020-11-03 RX ADMIN — OXYCODONE HYDROCHLORIDE 10 MILLIGRAM(S): 5 TABLET ORAL at 09:52

## 2020-11-03 RX ADMIN — Medication 325 MILLIGRAM(S): at 11:33

## 2020-11-03 RX ADMIN — Medication 100 MILLIGRAM(S): at 00:15

## 2020-11-03 RX ADMIN — OXYCODONE HYDROCHLORIDE 10 MILLIGRAM(S): 5 TABLET ORAL at 01:15

## 2020-11-03 RX ADMIN — Medication 500 MILLIGRAM(S): at 11:33

## 2020-11-03 RX ADMIN — OXYCODONE HYDROCHLORIDE 10 MILLIGRAM(S): 5 TABLET ORAL at 08:52

## 2020-11-03 RX ADMIN — PREGABALIN 1000 MICROGRAM(S): 225 CAPSULE ORAL at 12:00

## 2020-11-03 RX ADMIN — PANTOPRAZOLE SODIUM 40 MILLIGRAM(S): 20 TABLET, DELAYED RELEASE ORAL at 05:42

## 2020-11-03 RX ADMIN — Medication 1 TABLET(S): at 11:33

## 2020-11-03 RX ADMIN — OXYCODONE HYDROCHLORIDE 10 MILLIGRAM(S): 5 TABLET ORAL at 14:19

## 2020-11-03 RX ADMIN — Medication 30 MILLIGRAM(S): at 09:44

## 2020-11-03 RX ADMIN — Medication 30 MILLIGRAM(S): at 09:59

## 2020-11-03 NOTE — PROGRESS NOTE ADULT - SUBJECTIVE AND OBJECTIVE BOX
80yFemale s/p left reverse TSA POD#1.  Pt seen and examined in NAD. Pain controlled. Pt denies any new complaints. Pt denies CP/SOB/N/V/D/numbness/tingling/bowel or bladder dysfunction.     PE:   Neuro: AAOX3  RUE: Skin intact. +ROM shoulder/elbow/wrist/fingers. +ok/thumbsup/fingercross signs.  strength: 5/5.  RP2+ NVI.   LUE: Prineo dressing C/D/I. +ROM elbow/wrist/fingers. +ok/thumbsup/fingercross signs.  strength: 5/5.  RP2+ NVI.   B/L LE: Skin intact. +ROM hip/knee/ankle/toes. Ankle Dorsi/plantarflexion: 5/5. Calf: soft, compressible and nontender. DP/PT 2+ NVI.                             10.4   11.97 )-----------( 277      ( 03 Nov 2020 06:24 )             31.2       11-03    138  |  106  |  17  ----------------------------<  112<H>  3.5   |  22  |  0.69    Ca    8.1<L>      03 Nov 2020 06:24    TPro  5.8<L>  /  Alb  2.7<L>  /  TBili  0.3  /  DBili  x   /  AST  18  /  ALT  17  /  AlkPhos  61  11-03        A/P: 80yFemale s/p left reverse TSA POD#1   F/U AM Xray  Pain controlled  PT/OT: REJI LUGENE. No ROM to shoulder   DVT ppx: SCDs   Wound care, Isometric exercises, incentive spirometry   Medical consult appreciated  Discharge: planning for home today  All the above discussed and understood by pt

## 2020-11-03 NOTE — DISCHARGE NOTE NURSING/CASE MANAGEMENT/SOCIAL WORK - PATIENT PORTAL LINK FT
You can access the FollowMyHealth Patient Portal offered by [facility] by registering at the following website: [email or web address]. By joining [facility]’s FollowMyHealth portal, you will also be able to view your health information using other applications (apps) compatible with our system.

## 2020-11-03 NOTE — DISCHARGE NOTE NURSING/CASE MANAGEMENT/SOCIAL WORK - NSDCFUADDAPPT_GEN_ALL_CORE_FT
Follow up with your surgeon in two weeks. Call for appointment.  If you need more pain medication, call your surgeon's office. For medication refills or authorizations, please call [phone]  We recommend that you call and schedule a follow up appointment with your primary care physician for repeat blood work (CBC and BMP) for post hospital discharge follow-up care 2-4 weeks after your surgery.   Call your surgeon if you have increased redness/pain/drainage or fever. Return to ER for shortness of breath/calf tenderness.

## 2020-11-07 DIAGNOSIS — I10 ESSENTIAL (PRIMARY) HYPERTENSION: ICD-10-CM

## 2020-11-07 DIAGNOSIS — M19.012 PRIMARY OSTEOARTHRITIS, LEFT SHOULDER: ICD-10-CM

## 2020-11-07 DIAGNOSIS — Z96.643 PRESENCE OF ARTIFICIAL HIP JOINT, BILATERAL: ICD-10-CM

## 2020-11-07 DIAGNOSIS — Z79.82 LONG TERM (CURRENT) USE OF ASPIRIN: ICD-10-CM

## 2020-11-07 DIAGNOSIS — E78.5 HYPERLIPIDEMIA, UNSPECIFIED: ICD-10-CM

## 2021-06-23 NOTE — OCCUPATIONAL THERAPY INITIAL EVALUATION ADULT - LLE MMT, REHAB EVAL
Await fasting labs.  Watch carbohydrate intake.  Encourage weight reduction diet and exercise.  Follow BMI.     good

## 2021-11-04 ENCOUNTER — APPOINTMENT (OUTPATIENT)
Dept: DISASTER EMERGENCY | Facility: CLINIC | Age: 81
End: 2021-11-04

## 2021-11-05 LAB — SARS-COV-2 N GENE NPH QL NAA+PROBE: NOT DETECTED

## 2022-05-26 NOTE — ASU PREOP CHECKLIST - VIA
[facility] Consultation / Initial Visit      Today's Date: 5/26/2022    PCP: Nakul Smith MD  Referring Provider: No ref. provider found    INDICATION FOR CONSULTATION: Atrial fibrillation rapid ventricular response      HPI:       No cardiac events overnight. Patient is comfortable lying in bed. Telemetry reviewed. Sinus rhythm. ROS:     General: No fever or chills, No loss of appetite. RS: No hemoptysis  GI: No melena or hematochezia  : No urinary disturbance  Derm: No skin disorders   Heme: No blood dyscrasias. Remainder of 12 point systems reviewed and negative (or as mentioned in HPI)    Relevant Past Medical History:  Past Medical History:   Diagnosis Date   â¢ A-fib (CMS/HCC)    â¢ BPH (benign prostatic hyperplasia)    â¢ Chronic insomnia    â¢ COPD (chronic obstructive pulmonary disease) (CMS/HCC)    â¢ Depression    â¢ Emphysema, unspecified (CMS/HCC)    â¢ Essential (primary) hypertension    â¢ Gastroesophageal reflux disease    â¢ Heroin abuse (CMS/HCC)    â¢ History of esophagogastroduodenoscopy (EGD) 02/09/2021    Repeat EGD in 3 Years.  (02/09/2024)   â¢ History of home oxygen therapy    â¢ HTN (hypertension)    â¢ Hx of hepatitis C     s/p treatment in 7/2019   â¢ Methadone dependence (CMS/HCC)    â¢ Underweight       Past Surgical History:   Procedure Laterality Date   â¢ Abdomen surgery     â¢ Anes ercp w place endoscopic stent in [place] pancreatic oscar  03/20/2020    Dr. Taty Orellana   â¢ Cholecystectomy     â¢ Ercp w/ sphicterotomy  03/20/2020    Dr. Taty Orellana   â¢ Gallbladder surgery     â¢ Gastrectomy     â¢ Service to gastroenterology     â¢ Stomach surgery          Social History:  Social History     Tobacco Use   Smoking Status Current Every Day Smoker   â¢ Packs/day: 0.50   â¢ Types: Cigarettes   Smokeless Tobacco Never Used     Social History     Substance and Sexual Activity   Alcohol Use Not Currently     History   Drug Use Unknown     Comment: Methodone Treatment        Family History: Family History   Problem Relation Age of Onset   â¢ Stroke Mother    â¢ Hypertension Mother    â¢ Stroke Sister    â¢ Hypertension Sister        Inpatient Medications  â¢ cefTRIAXone  1,000 mg Intravenous Daily   â¢ carvedilol  12.5 mg Oral 2 times per day   â¢ metoCLOPramide (REGLAN) injection  5 mg Intravenous Q12H   â¢ ondansetron (ZOFRAN) parenteral  4 mg Intravenous Q12H   â¢ methaDONE  40 mg Oral Daily   â¢ pantoprazole  40 mg Oral 2 times per day   â¢ sodium chloride (PF)  10 mL Injection 2 times per day   â¢ polyethylene glycol  17 g Oral BID   â¢ docusate sodium-sennosides  2 tablet Oral Nightly   â¢ bisacodyl  10 mg Rectal Once   â¢ Potassium Standard Replacement Protocol   Does not apply See Admin Instructions   â¢ Magnesium Standard Replacement Protocol   Does not apply See Admin Instructions   â¢ Phosphorus Standard Replacement Protocol   Does not apply See Admin Instructions   â¢ amLODIPine  5 mg Oral Daily   â¢ umeclidinium-vilanterol  1 puff Inhalation Daily Resp   â¢ apixaBAN  2.5 mg Oral BID   â¢ buPROPion  150 mg Oral 2 times per day   â¢ fluticasone propionate  2 puff Inhalation BID Resp   â¢ mirtazapine  15 mg Oral Nightly         sodium chloride (PF), guaiFENesin-DM), hydrALAZINE, melatonin, ondansetron, prochlorperazine, prochlorperazine, acetaminophen, HYDROcodone-acetaminophen, polyethylene glycol, docusate sodium-sennosides, bisacodyl, magnesium hydroxide, aluminum-magnesium hydroxide-simethicone, albuterol     Allergy   ALLERGIES:  No Known Allergies         Examination:      Vital Last Value 24 Hour Range   Temperature 99 Â°F (37.2 Â°C) (05/26/22 1118) Temp  Min: 98.2 Â°F (36.8 Â°C)  Max: 99.3 Â°F (37.4 Â°C)   Pulse 65 (05/26/22 1118) Pulse  Min: 64  Max: 78   Respiratory 16 (05/26/22 1118) Resp  Min: 14  Max: 16   Non-Invasive  Blood Pressure 104/70 (05/26/22 1118) BP  Min: 104/70  Max: 168/100   Pulse Oximetry 96 % (05/26/22 1118) SpO2  Min: 95 %  Max: 99 %   Arterial   Blood Pressure   No data recorded     Tele: Normal sinus rhythm, paroxysmal atrial fibrillation rapid ventricular response      Intake/Output Summary (Last 24 hours) at 5/26/2022 1531  Last data filed at 5/26/2022 1000  Gross per 24 hour   Intake 514 ml   Output 200 ml   Net 314 ml        Weight    05/24/22 0416 05/24/22 2331 05/25/22 0824   Weight: 65.3 kg (144 lb) 59.3 kg (130 lb 11.7 oz) 59 kg (130 lb)         GENERAL: No apparent distress  HEENT: Normocephalic. Neck:  Supple neck. Oral mucosa : Pink and moist.    Endocrine: There is no goiter. CVS: Regular rate and rhythm. Normal first and second heart tones. Lung fields: Clear to auscultation bilaterally. GI: Soft. Nontender, nondistended. Lower extremity: No cyanosis, clubbing or edema. Peripheral vascular: Both lower extremities are warm and well perfused. Neuro: Awake and alert. Nonfocal examination.   Psych: Appropriate mood and affect  Integumentary: Warm and Dry      Clinical Data:       The following were personally visualized and interpreted by me:    LABS:    CBC  Recent Labs   Lab 05/26/22  0456 05/25/22  0443 05/24/22  0414   WBC 11.1* 11.5* 9.8   HCT 50.2 49.3 45.9   HGB 16.4 16.8 16.0    329 295       CMP  Recent Labs   Lab 05/26/22  0456 05/25/22  0557 05/24/22  0414   SODIUM  --  135 134*   POTASSIUM 3.9 3.6 3.7   CHLORIDE  --  97 98   CO2  --  28 25   GLUCOSE  --  122* 128*   BUN  --  17 15   CREATININE  --  0.77 0.82   CALCIUM  --  9.3 9.8   TOTPROTEIN  --  8.9* 9.1*   ALBUMIN  --  4.0 4.2   BILIRUBIN  --  0.5 0.6   AST  --  23 23   GPT  --  24 20   ALKPT  --  166* 172*       Cardiac Labs  Recent Labs   Lab 05/25/22  0919 05/24/22  0414   TSH 1.728  --    HTROPI  --  9       Lipid Panel  No results found    Coags  No results found    ABG  No results found    IMAGING:    ECG:   Encounter Date: 05/24/22   Electrocardiogram 12-Lead   Result Value    Ventricular Rate EKG/Min (BPM) 122    Atrial Rate (BPM) 120    QRS-Interval (MSEC) 88    QT-Interval (MSEC) 292    QTc 416    R Axis (Degrees) 91    T Axis (Degrees) 71    REPORT TEXT      Atrial fibrillation  with rapid ventricular response  Rightward axis  Nonspecific ST abnormality  , probably digitalis effect  Abnormal ECG  When compared with ECG of  25-MAY-2022 03:13,  No significant change was found  Confirmed by [facility] NHAN CRUZ ([id number]) on 5/26/2022 9:51:06 AM        CT of the abdomen and pelvis:  IMPRESSION:     1. Findings most compatible with mild ileus distal small bowel and there  is prominent distention of the rectum with feces and air which may be  compatible with impaction. Recommend correlation with clinical assessment. 2.  Stable 2.5 cm subtle hypodense lesion inferior aspect lateral portion  right lobe of the liver. Recommend correlation with clinical assessment  and if additional imaging is warranted, MRI examination liver could be  considered to characterize this finding further. 3.  Nonspecific mild fullness of the prostate gland remains unchanged. 4.  Stable hypodense lesions within the left and right kidney most  compatible with renal cysts. 5.  Atherosclerosis abdominal aorta. 6.  Mild chronic compression deformity superior endplate L2.  7.  Diffuse osteopenia compatible with osteoporosis. 8.  Evidence of cholecystectomy and evidence of prior surgery epigastric  region. 9.  Degenerative spondylosis as described above. Lexiscan stress test:  Nuclear medicine myocardial rest and stress SPECT imaging  Â   FINDINGS:  Â   Study was of good quality  Â   Left ventricle size and rest and stress images was normal  Â   Myocardial perfusion imaging showed homogenous tracer distribution on rest and stress images.   There was no evidence of any perfusion defect to indicate ischemia  Â   Segmental wall motion was normal  Â   Ejection fraction was normal  Â   There was no stress-induced transient ischemic dilatation  Â   CONCLUSIONS:  Â   There is appears to be a normal myocardial perfusion study with no evidence of ischemia. Â   Left ventricle appears hyperdynamic with ejection fraction of 82%. Â   T.i.d. 0.99. F I N A L    Â   Transcribed By: PETRONA   05/29/15 1:23 pm  Â   Dictated By:            Juvenal QUEZADA   Electronically Reviewed and Approved By:           Juvenal Crespo MD  05/29/15 1:23 pm    Echocardiogram:  Results for orders placed during the hospital encounter of 22    TRANSTHORACIC ECHO (TTE) COMPLETE W/ W/O IMAGING AGENT    Narrative  *[address] [place] [address], [address] [place] [address]  [phone]  Transthoracic Echocardiogram (TTE)    Patient: Sana Rosales Date/Time:      May 25 2022 10:16AM  MRN:     [id number]          FIN#:                 [id number]  :     1952       Ht/Wt:                182cm 59kg  Age:     81               BSA/BMI:              1.77m^2 17.8kg/m^2  Gender:  M                Baseline BP:          158 / 103  Ordering Physician:    Leonela Napier    Referring Physician:   Valery Marte    Attending Physician:   Siva Benitez    Diagnostic Physician:  Tati Oliveros MD  Sonographer:           Neptali Scott    --------------------------------------------------------------------------  INDICATIONS:   Atrial fibrillation.    --------------------------------------------------------------------------  STUDY CONCLUSIONS  SUMMARY:    1. Left ventricle: The cavity size is normal. Wall thickness is mildly  increased. Systolic function is normal. The ejection fraction was  measured by biplane method of disks. Unable to accurately assess left  ventricular diastolic function parameters due to atrial fibrillation. The ejection fraction is 60%. 2. Right ventricle: The cavity size is normal. Systolic function is  normal.  3. Baseline ECG: Atrial fibrillation.    --------------------------------------------------------------------------  STUDY DATA:   Procedure:  Transthoracic echocardiography was performed. Image quality was adequate.  The study was technically limited due to poor  acoustic window availability. M-mode, complete 2D, complete spectral  Doppler, and color Doppler. Study status:  Routine. Study completion:  There were no complications. FINDINGS    LEFT VENTRICLE:  The cavity size is normal. Wall thickness is mildly  increased. Systolic function is normal. Wall motion is normal; there are  no regional wall motion abnormalities. Unable to accurately assess left  ventricular diastolic function parameters due to atrial fibrillation. The ejection fraction was measured by biplane method of disks. The  ejection fraction is 60%. The tissue Doppler parameters are abnormal. Left  ventricular diastolic function parameters are indeterminate at present  time. AORTIC VALVE:  Poorly visualized. The annulus is normal. Structurally  normal valve. The valve is trileaflet. The leaflets are normal thickness. Cusp separation is normal.  Doppler:  Transvalvular velocity is within the  normal range. There is no stenosis. No regurgitation. The LVOT to  aortic valve VTI ratio is 0.89. The valve area by the velocity-time  integral method is 5.5cm^2. The valve area index by the velocity-time  integral method is 3.1cm^2/m^2. The ratio of LVOT to aortic valve peak  velocity is 0.98. The valve area by the peak velocity method is 6.1cm^2. The valve area index by the peak velocity method is 3.42cm^2/m^2. The  mean systolic gradient is 3mm Hg. The peak systolic gradient is 6mm Hg. AORTA:  Aortic root: The aortic root is normal in size. MITRAL VALVE:  Not well visualized. Structurally normal valve. The  annulus is normal. The leaflets are normal thickness. Leaflet separation  is normal.  Doppler:  Transvalvular velocity is within the normal range. There is no evidence for stenosis. No regurgitation. ATRIAL SEPTUM:  Well visualized. No defect or patent foramen ovale is  identified. LEFT ATRIUM:  Well visualized.  The atrium is normal in size.    RIGHT VENTRICLE:  Not well visualized. The cavity size is normal. Systolic  function is normal. Systolic pressure is within the normal range. The  estimated peak pressure is 25mm Hg. The RV pressure during systole  by Doppler is 26mm Hg. PULMONIC VALVE:   Not well visualized. Structurally normal valve. Doppler:  Transvalvular velocity is within the normal range. No  significant regurgitation. TRICUSPID VALVE:  Not well visualized. Structurally normal valve. Leaflet separation is normal.  Doppler:  Transvalvular velocity is within  the normal range. There is no evidence for stenosis. Mild regurgitation. RIGHT ATRIUM:  Well visualized. The atrium is normal in size. The  estimated central venous pressure is 3mm Hg. PERICARDIUM:  There is no pericardial effusion. SYSTEMIC VEINS:  Inferior vena cava: The vessel is normal in size. The respirophasic  diameter changes are in the normal range (greater than or equal to 50%).     BASELINE ECG:   Atrial fibrillation.    --------------------------------------------------------------------------  Measurements    Left ventricle       Value             12/18/2020 Ref    Right ventricle          Value          12/18/2020 Ref  SU, LAX    (L)      3.8   cm          3.5        4.2 -  continued  chord                                             5.8    Pressure, S              26    mm Hg    37         ----  ESD, LAX             3.3   cm          2.3        2.5 -  S' lateral               13.2  cm/sec   14         6 -  chord                                             4.0                                                       13.4  SU/bsa,    (L)      2.1   cm/m^2      2.0        2.2 -  LAX chord                                         3.0    Left atrium              Value          12/18/2020 Ref  ESD/bsa,             1.9   cm/m^2      1.3        1.3 -  AP dim, ES               3.1   cm       ---------- 3.0  LAX chord 2.1                                                       -  PW, ED, LAX (H)      1.2   cm          0.8        0.6 -                                                     4.0  1.0    AP dim index             1.8   cm/m^2   ---------- 1.5  SU major            7.7   cm          ---------- -----                                                     -  ax, A4C                                                                                                     2.3  ESD major            6.6   cm          ---------- -----  Area ES, A4C             14    cm^2     ---------- <=20  ax, A4C                                                  Area ES, A2C             19    cm^2     ---------- ----  FS major             15    %           ---------- -----  Vol, S                   32    ml       57         18 -  axis, A4C                                                                                                   58  SU/bsa              4.4   cm/m^2      ---------- -----  Vol/bsa, S               18    ml/m^2   33         16 -  major ax,                                                                                                   34  A4C                                                      Vol, ES, 1-p A4C         32    ml       47         18 -  ESD/bsa              3.7   cm/m^2      ---------- -----                                                     58  major ax,                                                Vol/bsa, ES, 1-p         18    ml/m^2   27         12 -  A4C                                                      A4C                                                37  OG, A4C             22.8  cm^2        ---------- -----  Vol, ES, 1-p A2C         47    ml       48         18 -  ANA MARÍA, A4C             13.5  cm^2        ---------- -----                                                     58  FAC, A4C             41    %           ---------- -----  Vol/bsa, ES, 1-p         27    ml/m^2   28         11 -  PW, ED      (H)      1.2   cm          0.8        0.6 -  A2C                                                43  1.0    Vol, ES, 2-p             42    ml       ---------- ----  IVS/PW, ED           0.67              1.07       -----  Vol/bsa, ES, 2-p         24    ml/m^2   ---------- 16 -  EDV         (L)      55    ml          52         62 -                                                      34  150  ESV                  36    ml          19         21 -   Aortic valve             Value          12/18/2020 Ref  61     Peak v, S                1.2   m/sec    1.2        ----  EF                   60    %           64         52 -   Mean v, S                0.82  m/sec    0.73       ----  72     Mean grad, S             3     mm Hg    2          ----  SV                   18    ml          33         -----  Peak grad, S             6     mm Hg    5          ----  EDV/bsa     (L)      31    ml/m^2      30         34 -   LVOT/AV, VTI             0.89           0.89       ----  74     ratio  ESV/bsa              20    ml/m^2      11         11 -   GABRIELA, VTI                 5.5   cm^2     3.0        ----  31     GABRIELA/bsa, VTI             3.1   cm^2/m^2 1. 75       ----  SV/bsa               10    ml/m^2      19         -----  LVOT/AV, Vpeak           0.98           ---------- ----  SV, 1-p A4C          32    ml          54         -----  ratio  SV/bsa, 1-p          18    ml/m^2      31         -----  GABRIELA, Vmax                6.1   cm^2     3.0        ----  A4C                                                      GABRIELA/bsa, Vmax            3.42  cm^2/m^2 1. 72       ----  EDV, 2-p    (L)      61    ml          77         62 -  150    Mitral valve             Value          12/18/2020 Ref  ESV, 2-p             25    ml          28         21 -   Peak E                   0.6   m/sec    0.68       ----  61     Peak A                   0.71  m/sec    0.53       ----  EDV/bsa,             34    ml/m^2      44         34 -   Decel time 246   ms       187        ----  2-p                                               74     Peak E/A ratio           0.8            1.28       ----  ESV/bsa,             14    ml/m^2      16         11 -  2-p                                               31     Tricuspid valve          Value          12/18/2020 Ref  E', lat     (L)      8.7   cm/sec      10         >=10   TR peak v                2.4   m/sec    -2.3       <=2.  jojo, TDI                                                                                                    8  E/e', lat            7                 7          -----  Peak RV-RA grad,         23    mm Hg    22         ----  jojo, TDI                                                 S  E', med     (L)      6.85  cm/sec      11         >=7    Max TR wyatt               2.38  m/sec    ---------- ----  jojo, TDI  E/e', med            9                 6          -----  Aortic root              Value          12/18/2020 Ref  jojo, TDI                                                 Root diam, ED            3.8   cm       3.3        <4.0  E', avg,             7.775 cm/sec      10.5       -----  TDI                                                      Pulmonary artery         Value          12/18/2020 Ref  E/e', avg,           8                 7          <=14   Pressure, S              26    mm Hg    37         ----  TDI  Systemic veins           Value          12/18/2020 Ref  LVOT                 Value             12/18/2020 Ref    Estimated CVP            3     mm Hg    15         ----  Diam, S              2.8   cm          2.1        -----  Area                 6.2   cm^2        3.4        -----  Peak wyatt, S          1.21  m/sec       -1.01      -----  Peak grad,           6     mm Hg       4          -----  S  Qs                   12.6  L/min       ---------- -----  Qs/bsa               7.1   L/(min-m^2) ---------- -----    Ventricular septum   Value             12/18/2020 Ref  IVS, ED 0.8   cm          0.9        0.6 -  1.0    Right ventricle      Value             12/18/2020 Ref  SU, LAX             2.1   cm          3.3        -----  TAPSE, MM            2.6   cm          ---------- 1.7 -  3.1  Legend:  (L)  and  (H)  shan values outside specified reference range. Prepared and electronically signed by  Cj Callahan MD  05/25/2022 13:51       Cath Report:  No results found for this or any previous visit. Twelve-lead EKG:          Assessment/Plans:     Studies interpreted and reviewed/visualize:  1. Telemetry    Assessment and plan:    Atrial fibrillation with rapid ventricular response  Essential hypertension  Abdominal pain  History of COPD  History of substance abuse, opiates    No further episodes of atrial fibrillation. Patient nausea is resolved. A. fib likely exacerbated by nausea and volume status. Okay to discharge on anticoagulation and carvedilol. Blood pressure adequately controlled. Follow-up with cardiologist in outpatient setting. Patient has established care with a cardiologist already. Thank you for allowing us to participate in this patient's care. Please do not hesitate to call with any questions or concerns. wheelchair

## 2022-11-11 ENCOUNTER — APPOINTMENT (OUTPATIENT)
Dept: ORTHOPEDIC SURGERY | Facility: CLINIC | Age: 82
End: 2022-11-11

## 2022-11-11 PROCEDURE — 72110 X-RAY EXAM L-2 SPINE 4/>VWS: CPT

## 2022-11-11 PROCEDURE — 72170 X-RAY EXAM OF PELVIS: CPT

## 2022-11-11 PROCEDURE — 99214 OFFICE O/P EST MOD 30 MIN: CPT

## 2022-11-11 PROCEDURE — 72050 X-RAY EXAM NECK SPINE 4/5VWS: CPT

## 2022-11-11 RX ORDER — METHYLPREDNISOLONE 4 MG/1
4 TABLET ORAL
Qty: 1 | Refills: 0 | Status: ACTIVE | COMMUNITY
Start: 2022-11-11 | End: 1900-01-01

## 2022-11-11 NOTE — HISTORY OF PRESENT ILLNESS
[Lower back] : lower back [10] : 10 [Dull/Aching] : dull/aching [Sharp] : sharp [de-identified] : Findings: Alignment appears anatomic. Vertebral body heights are maintained. There is multilevel disc\par desiccation, loss of disc height, and degenerative endplate changes in the lower cervical spine without acute\par fracture and multilevel uncovertebral hypertrophy. The visualized posterior fossa structures appear\par unremarkable. There are findings suggesting an enlarged thyroid gland asymmetric on the left incompletely\par evaluated on the current exam.\par C2-C3: There is slight posterior central disc bulging, bony ridging, and bilateral uncovertebral hypertrophy.\par C3-C4: There is right paracentral disc bulging, bony ridging, and mild uncovertebral hypertrophy.\par C4-C5: There is mild disc bulging, uncovertebral hypertrophy, and mild bilateral foraminal narrowing left\par greater than right.\par C5-C6: There is a broad-based posterior disc herniation asymmetric on the left with mild cord compression on\par the left and left greater than right exiting C6 nerve root impingement.\par C6-C7: There is a broad-based posterior disc herniation, cord impingement, and bilateral exiting C7 nerve root\par impingement.\par Ind. review- bulging C5/6, C6/7 with foraminal stenosis\par \par _______________\par \par 8/12/20- RHDF presents with neck pain and stiffness x one month. Reports pain radiating down the RUE lateral arm and\par forearm, not typically in to the hand. Denies LUE symptoms, numbness/tingling in BUE. No dexterity issues. Reports\par some gait issues attributed to recent LINA. \par Reports LBP x one week, not radiating the BLE. No numbness/tingling in BLE. \par No bowel/bladder incontinence. \par 6/11/21- Here for follow up neck pain. She reports increased pain since yesterday. Pain radiates to her head. Denies\par numbness/tingling.\par 6/24/21- MRI f/u. 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 Neck pain much improved with PT. Seen by Ty\par 10/12/21- pain in the neck returned, no history of injury. Pain began about 1 week ago. Pain is unbearable. She tried\par ibuprofen without relief.\par 11/11/2022: patient  presents for worsening neck and  lower back pain since 4 days ago, when patient reports feeling a pop in the left hip while cleaning her closet, causing her left leg to give out; patient fell onto her left side.  radiation down LLE laterally to the ankle, w/o N/T. Denies pain/N/T in BUEs. Denies feroz b/b incontinence. \par  [] : no [FreeTextEntry5] : pt felt a pop in the left hip on Monday and has felt pain in the lower back and left leg since. Pt has had hip surgeries with Dr. Hayward in the past. [FreeTextEntry7] : down the left leg

## 2022-11-11 NOTE — ASSESSMENT
[FreeTextEntry1] : MDP\par Patient has failed 6+ weeks of conservative care, including physical therapy/HEP and NSAIDS. Will obtain lumbar MRI to rule out HNP; will also be used to guide potential future injections/surgical management.\par follow up after MRI\par

## 2022-11-11 NOTE — IMAGING
[AP] : anteroposterior [There are no fractures, subluxations or dislocations. No significant abnormalities are seen] : There are no fractures, subluxations or dislocations. No significant abnormalities are seen [Facet arthropathy] : Facet arthropathy [Disc space narrowing] : Disc space narrowing [de-identified] : C spine\par Inspection of the cervical spine is as follows: no erythema and no ecchymosis. \par Palpation of the cervical spine is as follows: left paracervical tenderness \par Range of motion of the cervical spine is as follows: diminished range of motion in all planes \par Strength Testing for the cervical spine is as follows: Left Deltoid strength 5/5, Right Deltoid strength 5/5, Left Biceps 5/5, Right Biceps 5/5, Left Triceps 5/5, Right Triceps 5/5, Left Wrist Flexors 5/5, Right Wrist Flexors 5/5, Left Finger Abductors 5/5, Right Finger Abductors 5/5, Left Grasp 5/5 and Right Grasp 5/5 Neurological testing for the cervical spine is as follows: positive Nguyen reflex on right. Deep tendon reflexes LEFT reveals Biceps Reflex Diminished and brachioradialis Reflex Diminished. Deep tendon reflexes RIGHT reveals biceps reflex diminished and brachioradialis reflex diminished.\par  light touch is intact throughout both upper extremities\par \par LSPINE\par Palpation: No tenderness to palpation or spasm in bilateral thoracic and lumbar paraspinal musculature, no SI joint tenderness to palpation\par ROM: diminished all planes\par Strength: 5/5 bilateral hip flexors, knee extensors, ankle dorsiflexors, EHL, ankle plantarflexors\par Sensation: Sensation present to light touch bilateral L2-S1 distributions\par Provocative maneuvers: + L seated straight leg raise, - R seated straight leg riase\par \par Bilateral hips-\par Palpation: L GT TTP\par ROM: L lateral hip pain with flexion and internal rotation\par \par ambulation with walker\par  [FreeTextEntry1] : DDD L3-S1 [de-identified] : b/l LINA in place

## 2022-11-12 ENCOUNTER — APPOINTMENT (OUTPATIENT)
Dept: MRI IMAGING | Facility: CLINIC | Age: 82
End: 2022-11-12

## 2022-11-15 ENCOUNTER — FORM ENCOUNTER (OUTPATIENT)
Age: 82
End: 2022-11-15

## 2022-11-15 RX ORDER — METHOCARBAMOL 500 MG/1
500 TABLET, FILM COATED ORAL
Qty: 60 | Refills: 0 | Status: ACTIVE | COMMUNITY
Start: 2022-11-15 | End: 1900-01-01

## 2022-11-15 RX ORDER — CYCLOBENZAPRINE HYDROCHLORIDE 5 MG/1
5 TABLET, FILM COATED ORAL EVERY 8 HOURS
Qty: 90 | Refills: 0 | Status: DISCONTINUED | COMMUNITY
Start: 2022-11-11 | End: 2022-11-15

## 2022-11-16 ENCOUNTER — APPOINTMENT (OUTPATIENT)
Dept: MRI IMAGING | Facility: CLINIC | Age: 82
End: 2022-11-16

## 2022-11-16 PROCEDURE — 72148 MRI LUMBAR SPINE W/O DYE: CPT

## 2022-12-06 ENCOUNTER — APPOINTMENT (OUTPATIENT)
Dept: ORTHOPEDIC SURGERY | Facility: CLINIC | Age: 82
End: 2022-12-06

## 2022-12-06 VITALS — WEIGHT: 169 LBS | HEIGHT: 67 IN | BODY MASS INDEX: 26.53 KG/M2

## 2022-12-06 DIAGNOSIS — M21.372 FOOT DROP, LEFT FOOT: ICD-10-CM

## 2022-12-06 PROCEDURE — 99214 OFFICE O/P EST MOD 30 MIN: CPT

## 2022-12-07 ENCOUNTER — APPOINTMENT (OUTPATIENT)
Dept: ORTHOPEDIC SURGERY | Facility: CLINIC | Age: 82
End: 2022-12-07

## 2022-12-07 PROBLEM — M21.372 LEFT FOOT DROP: Status: ACTIVE | Noted: 2022-12-07

## 2022-12-07 NOTE — HISTORY OF PRESENT ILLNESS
[Lower back] : lower back [Sudden] : sudden [8] : 8 [Sharp] : sharp [Frequent] : frequent [de-identified] : This is Ms. LILLIAN DENIS  a 82 year old female who comes in today complaining of lower back pain.  She had seen Dr Wei but her follow up was cancelled and she couldn’t get in to see him until Dec 28th.  She took an MDP and cyclobenzaprine with no help.  She had an mri.   [] : no [FreeTextEntry7] : left leg [de-identified] : dr kerns [de-identified] : mri

## 2022-12-07 NOTE — DISCUSSION/SUMMARY
[de-identified] : left radiculopathy ,motor weakness. discussed with spine surgeon dr. burnett. sonja with pain management for sandra. begin PT. arsalan burnett 2 weeks.\par \par ------------------------------------------------------------------------------------------------------------------------------------------------------\par \par The patient was advised of the diagnosis.  The natural history of the pathology was explained in full. All questions were answered.  The risks and benefits of conservative and interventional treatment alternatives were explained to the patient\par \par \par ------------------------------------------------------------------------------------------------------------------------------------------------------\par \par MRI(s) and/or other advanced imaging studies (CT/etc) were reviewed with the patient. Implications of the study(ies) together with the patient's clinical picture were discussed to formulate a working diagnosis and management options were detailed.\par

## 2022-12-07 NOTE — DATA REVIEWED
[MRI] : MRI [Lumbar Spine] : lumbar spine [I independently reviewed and interpreted images and report] : I independently reviewed and interpreted images and report [FreeTextEntry1] : L4-5 central hnp R>L , prevoius L l4-5, foraminotomy. L5-s1 left hnp. left side compression

## 2022-12-07 NOTE — IMAGING
[de-identified] : \par ------------------------------------------------------------------------------------------------------------------------------------------------------\par \par Thoracic/Lumbar spine exam: \par \par Inspection:    (-) Abnormal alignment (kyphosis/lordosis/scoliosis)   (-) Atrophy \par ROM: \par    Pain:           (-) Flexion/extension     (-) Rotation\par    Stiffness:   (-) Flexion/extension     (-) Rotation\par                       (-) Hamstring tightness\par Tenderness/Spasm:           \par    Lumbar paraspinal:          (-) Right    +Left    (-) Midline \par    Thoracic paraspinal:        (-) Right    (-) Left    (-) Midline\par    PSIS:                                (-) Right    (-) Left\par    SI joint:                             (-) Right    (-) Left\par    Greater troch:                  (-) Right    (-) Left\par Strength: (out of 5)\par    Illiopsoas:           Right: 5   .    Left: 5\par    Quad:                 Right: 5   .    Left: 5\par    Hamstrings:        Right: 5   .    Left: 5\par    Anterior tibialis:  Right: 5    .   Left: 4-\par    Gastrocsoleus:  Right: 5    .   Left: 5\par    EHL:                    Right: 5    .   Left: 4-\par Neuro: DTR's wnl.  Sensation to light touch grossly in tact in all distributions. \par    +SLR\par    (-) Femoral stretch\par Vascularity: Extremity warm and well perfused\par Gait: normal\par \par

## 2022-12-09 ENCOUNTER — APPOINTMENT (OUTPATIENT)
Dept: PAIN MANAGEMENT | Facility: CLINIC | Age: 82
End: 2022-12-09

## 2022-12-09 VITALS — BODY MASS INDEX: 25.27 KG/M2 | WEIGHT: 161 LBS | HEIGHT: 67 IN

## 2022-12-09 DIAGNOSIS — M51.9 UNSPECIFIED THORACIC, THORACOLUMBAR AND LUMBOSACRAL INTERVERTEBRAL DISC DISORDER: ICD-10-CM

## 2022-12-09 DIAGNOSIS — M54.16 RADICULOPATHY, LUMBAR REGION: ICD-10-CM

## 2022-12-09 PROCEDURE — 99214 OFFICE O/P EST MOD 30 MIN: CPT

## 2022-12-09 NOTE — DISCUSSION/SUMMARY
[de-identified] : After discussing various treatment options with the patient including but not limited to oral medications, physical therapy, exercise modalities as well as interventional spinal injections, we have decided with the following plan:\par \par - Continue Home exercises, stretching, activity modification, physical therapy, and conservative care.\par - MRI report and/or images was reviewed and discussed with the patient.\par - Recommend Left L4-5, L5-S1 Transforaminal Epidural Steroid Injection under fluoroscopic guidance with image.\par - The risks, benefits and alternatives of the proposed procedure were explained in detail with the patient. The risks outlined include but are not limited to infection, bleeding, post-dural puncture headache, nerve injury, a temporary increase in pain, failure to resolve symptoms, allergic reaction, symptom recurrence, and possible elevation of blood sugar in diabetics. All questions were answered to patient's apparent satisfaction and he/she verbalized an understanding.\par - Patient is presenting with acute/sub-acute radicular pain with impairment in ADLs and functionality.  The pain has not responded to conservative care including NSAID therapy and/or physical therapy.  There is no bleeding tendency, unstable medical condition, or systemic infection.\par - Follow up in 1-2 weeks post injection for re-evaluation.\par - If llittle/no relief would consider left sided L5-S1 ILESI. \par *pt takes 81mg Aspirin\par

## 2022-12-09 NOTE — HISTORY OF PRESENT ILLNESS
[Lower back] : lower back [Left Leg] : left leg [7] : 7 [9] : 9 [Dull/Aching] : dull/aching [Intermittent] : intermittent [Household chores] : household chores [Sleep] : sleep [Meds] : meds [Nothing helps with pain getting better] : Nothing helps with pain getting better [Standing] : standing [Walking] : walking [FreeTextEntry1] : 12/09/22: Back pain started beg of Nov 2022 after fall. Pain is on the left lower back and radiates to the left buttock and and the left latera ankle described as a sharp stabbing pain. s/p L4-5 laminotomy in 1990. \par Completed MDP with no relief and taking flexeril which is not helping either. \par \par *pt takes 81mg Aspirin\par \par MRI L-spine 11/16/22 independently reviewed: L4-5 and L5-S1 left HNP with Left L5, S1 impingement.\par \par 12/27/21: s/p C7-T1 THA on 12/14/21 with >90% relief and improvement of ADLs. Pain only occurs now at night and radiates up the neck to the head. \par \par 11/19/21: s/p C7-T1 THA on 11/8/21 with 90% relief and improvement of ALDs. Pain is almost completely gone but still has some heaviness. \par \par 10/15/21: Pain is now on the b/l neck L>R described as a nerve pain. Takes Tylenol and methocarbamol PRN with little relief. \par \par Initial HPI:\par Pain started about a year ago and is in the center of the neck described as a sharp pain. Has been doing PT which\par helped a lot and is now feeling much better. Takes ibuprofen PRN. Pt had thyroid biopsy and is non-cancerous. [] : This patient has had an injection before: no [FreeTextEntry6] : pins and needle  [FreeTextEntry7] : left leg ankle and hip  [de-identified] : l mri

## 2022-12-09 NOTE — REASON FOR VISIT
[Initial Consultation] : an initial pain management consultation [FreeTextEntry2] : lower back , left leg , ankle  and hip

## 2022-12-09 NOTE — PHYSICAL EXAM
[de-identified] : Constitutional; Appears well, no apparent distress\par Ability to communicate: Normal \par Respiratory: non-labored breathing\par Skin: No rash noted\par Head: Normocephalic, atraumatic\par Neck: no visible thyroid enlargement\par Eyes: Extraocular movements intact\par Neurologic: Alert and oriented x3\par Psychiatric: normal mood, affect and behavior \par \par  [] : non-antalgic

## 2023-01-10 LAB — SARS-COV-2 N GENE NPH QL NAA+PROBE: NOT DETECTED

## 2023-01-11 ENCOUNTER — APPOINTMENT (OUTPATIENT)
Age: 83
End: 2023-01-11
Payer: MEDICARE

## 2023-01-11 PROCEDURE — 62321 NJX INTERLAMINAR CRV/THRC: CPT

## 2023-01-23 NOTE — DISCUSSION/SUMMARY
[de-identified] : After discussing various treatment options with the patient including but not limited to oral medications, physical therapy, exercise modalities as well as interventional spinal injections, we have decided with the following plan:\par \par - Continue Home exercises, stretching, activity modification, physical therapy, and conservative care.\par - MRI report and/or images was reviewed and discussed with the patient.\par - Recommend Left L4-5, L5-S1 Transforaminal Epidural Steroid Injection under fluoroscopic guidance with image.\par - The risks, benefits and alternatives of the proposed procedure were explained in detail with the patient. The risks outlined include but are not limited to infection, bleeding, post-dural puncture headache, nerve injury, a temporary increase in pain, failure to resolve symptoms, allergic reaction, symptom recurrence, and possible elevation of blood sugar in diabetics. All questions were answered to patient's apparent satisfaction and he/she verbalized an understanding.\par - Patient is presenting with acute/sub-acute radicular pain with impairment in ADLs and functionality.  The pain has not responded to conservative care including NSAID therapy and/or physical therapy.  There is no bleeding tendency, unstable medical condition, or systemic infection.\par - Follow up in 1-2 weeks post injection for re-evaluation.\par - If llittle/no relief would consider left sided L5-S1 ILESI. \par *pt takes 81mg Aspirin\par

## 2023-01-23 NOTE — PHYSICAL EXAM
[de-identified] : Constitutional; Appears well, no apparent distress\par Ability to communicate: Normal \par Respiratory: non-labored breathing\par Skin: No rash noted\par Head: Normocephalic, atraumatic\par Neck: no visible thyroid enlargement\par Eyes: Extraocular movements intact\par Neurologic: Alert and oriented x3\par Psychiatric: normal mood, affect and behavior \par \par  [] : non-antalgic

## 2023-01-23 NOTE — HISTORY OF PRESENT ILLNESS
[FreeTextEntry1] : 01/27/2023: s/p C7-T1 THA on 1/11/23 with 50% relief and improvement of ADLs \par \par 12/09/22: Back pain started beg of Nov 2022 after fall. Pain is on the left lower back and radiates to the left buttock and and the left latera ankle described as a sharp stabbing pain. s/p L4-5 laminotomy in 1990. \par Completed MDP with no relief and taking flexeril which is not helping either. \par \par *pt takes 81mg Aspirin\par \par MRI L-spine 11/16/22 independently reviewed: L4-5 and L5-S1 left HNP with Left L5, S1 impingement.\par \par 12/27/21: s/p C7-T1 THA on 12/14/21 with >90% relief and improvement of ADLs. Pain only occurs now at night and radiates up the neck to the head. \par \par 11/19/21: s/p C7-T1 THA on 11/8/21 with 90% relief and improvement of ALDs. Pain is almost completely gone but still has some heaviness. \par \par 10/15/21: Pain is now on the b/l neck L>R described as a nerve pain. Takes Tylenol and methocarbamol PRN with little relief. \par \par Initial HPI:\par Pain started about a year ago and is in the center of the neck described as a sharp pain. Has been doing PT which\par helped a lot and is now feeling much better. Takes ibuprofen PRN. Pt had thyroid biopsy and is non-cancerous. [Lower back] : lower back [Left Leg] : left leg [7] : 7 [9] : 9 [Dull/Aching] : dull/aching [Intermittent] : intermittent [Household chores] : household chores [Sleep] : sleep [Meds] : meds [Nothing helps with pain getting better] : Nothing helps with pain getting better [Standing] : standing [Walking] : walking [] : This patient has had an injection before: no [FreeTextEntry6] : pins and needle  [FreeTextEntry7] : left leg ankle and hip  [de-identified] : l mri

## 2023-01-27 ENCOUNTER — APPOINTMENT (OUTPATIENT)
Dept: PAIN MANAGEMENT | Facility: CLINIC | Age: 83
End: 2023-01-27

## 2023-03-23 NOTE — OCCUPATIONAL THERAPY INITIAL EVALUATION ADULT - UPPER BODY DRESSING, PREVIOUS LEVEL OF FUNCTION, OT EVAL
If he needs a pre-op, this would require an OV.      Signed, Tello Pro MD, FAAP, FACP  [facility]     independent

## 2023-05-03 ENCOUNTER — APPOINTMENT (OUTPATIENT)
Dept: ORTHOPEDIC SURGERY | Facility: CLINIC | Age: 83
End: 2023-05-03
Payer: MEDICARE

## 2023-05-03 VITALS — HEIGHT: 67 IN | WEIGHT: 161 LBS | BODY MASS INDEX: 25.27 KG/M2

## 2023-05-03 PROCEDURE — 73030 X-RAY EXAM OF SHOULDER: CPT | Mod: 50

## 2023-05-03 PROCEDURE — 99215 OFFICE O/P EST HI 40 MIN: CPT | Mod: 57

## 2023-05-03 PROCEDURE — 73010 X-RAY EXAM OF SHOULDER BLADE: CPT | Mod: 50

## 2023-05-11 ENCOUNTER — RESULT REVIEW (OUTPATIENT)
Age: 83
End: 2023-05-11

## 2023-05-12 ENCOUNTER — OUTPATIENT (OUTPATIENT)
Dept: OUTPATIENT SERVICES | Facility: HOSPITAL | Age: 83
LOS: 1 days | Discharge: HOME | End: 2023-05-12
Payer: MEDICARE

## 2023-05-12 VITALS
OXYGEN SATURATION: 97 % | TEMPERATURE: 99 F | SYSTOLIC BLOOD PRESSURE: 121 MMHG | HEART RATE: 72 BPM | WEIGHT: 166.67 LBS | RESPIRATION RATE: 18 BRPM | DIASTOLIC BLOOD PRESSURE: 84 MMHG | HEIGHT: 67 IN

## 2023-05-12 DIAGNOSIS — Z98.891 HISTORY OF UTERINE SCAR FROM PREVIOUS SURGERY: Chronic | ICD-10-CM

## 2023-05-12 DIAGNOSIS — Z98.890 OTHER SPECIFIED POSTPROCEDURAL STATES: Chronic | ICD-10-CM

## 2023-05-12 DIAGNOSIS — Z01.818 ENCOUNTER FOR OTHER PREPROCEDURAL EXAMINATION: ICD-10-CM

## 2023-05-12 DIAGNOSIS — Z01.812 ENCOUNTER FOR PREPROCEDURAL LABORATORY EXAMINATION: ICD-10-CM

## 2023-05-12 DIAGNOSIS — Z96.641 PRESENCE OF RIGHT ARTIFICIAL HIP JOINT: Chronic | ICD-10-CM

## 2023-05-12 DIAGNOSIS — Z96.642 PRESENCE OF LEFT ARTIFICIAL HIP JOINT: Chronic | ICD-10-CM

## 2023-05-12 DIAGNOSIS — Z90.49 ACQUIRED ABSENCE OF OTHER SPECIFIED PARTS OF DIGESTIVE TRACT: Chronic | ICD-10-CM

## 2023-05-12 DIAGNOSIS — M19.011 PRIMARY OSTEOARTHRITIS, RIGHT SHOULDER: ICD-10-CM

## 2023-05-12 DIAGNOSIS — I10 ESSENTIAL (PRIMARY) HYPERTENSION: ICD-10-CM

## 2023-05-12 LAB
A1C WITH ESTIMATED AVERAGE GLUCOSE RESULT: 6.2 % — HIGH (ref 4–5.6)
ALBUMIN SERPL ELPH-MCNC: 3.6 G/DL — SIGNIFICANT CHANGE UP (ref 3.3–5)
ALP SERPL-CCNC: 91 U/L — SIGNIFICANT CHANGE UP (ref 40–120)
ALT FLD-CCNC: 27 U/L — SIGNIFICANT CHANGE UP (ref 12–78)
ANION GAP SERPL CALC-SCNC: 4 MMOL/L — LOW (ref 5–17)
APTT BLD: 26.1 SEC — LOW (ref 27.5–35.5)
AST SERPL-CCNC: 16 U/L — SIGNIFICANT CHANGE UP (ref 15–37)
BASOPHILS # BLD AUTO: 0.07 K/UL — SIGNIFICANT CHANGE UP (ref 0–0.2)
BASOPHILS NFR BLD AUTO: 0.9 % — SIGNIFICANT CHANGE UP (ref 0–2)
BILIRUB SERPL-MCNC: 0.4 MG/DL — SIGNIFICANT CHANGE UP (ref 0.2–1.2)
BLD GP AB SCN SERPL QL: SIGNIFICANT CHANGE UP
BUN SERPL-MCNC: 22 MG/DL — SIGNIFICANT CHANGE UP (ref 7–23)
CALCIUM SERPL-MCNC: 9.1 MG/DL — SIGNIFICANT CHANGE UP (ref 8.5–10.1)
CHLORIDE SERPL-SCNC: 110 MMOL/L — HIGH (ref 96–108)
CO2 SERPL-SCNC: 26 MMOL/L — SIGNIFICANT CHANGE UP (ref 22–31)
CREAT SERPL-MCNC: 0.78 MG/DL — SIGNIFICANT CHANGE UP (ref 0.5–1.3)
EGFR: 77 ML/MIN/1.73M2 — SIGNIFICANT CHANGE UP
EOSINOPHIL # BLD AUTO: 0.16 K/UL — SIGNIFICANT CHANGE UP (ref 0–0.5)
EOSINOPHIL NFR BLD AUTO: 2.1 % — SIGNIFICANT CHANGE UP (ref 0–6)
ESTIMATED AVERAGE GLUCOSE: 131 MG/DL — HIGH (ref 68–114)
GLUCOSE SERPL-MCNC: 91 MG/DL — SIGNIFICANT CHANGE UP (ref 70–99)
HCT VFR BLD CALC: 41.3 % — SIGNIFICANT CHANGE UP (ref 34.5–45)
HGB BLD-MCNC: 13.5 G/DL — SIGNIFICANT CHANGE UP (ref 11.5–15.5)
IMM GRANULOCYTES NFR BLD AUTO: 0.3 % — SIGNIFICANT CHANGE UP (ref 0–0.9)
INR BLD: 0.92 RATIO — SIGNIFICANT CHANGE UP (ref 0.88–1.16)
LYMPHOCYTES # BLD AUTO: 2.89 K/UL — SIGNIFICANT CHANGE UP (ref 1–3.3)
LYMPHOCYTES # BLD AUTO: 37.6 % — SIGNIFICANT CHANGE UP (ref 13–44)
MCHC RBC-ENTMCNC: 28.7 PG — SIGNIFICANT CHANGE UP (ref 27–34)
MCHC RBC-ENTMCNC: 32.7 G/DL — SIGNIFICANT CHANGE UP (ref 32–36)
MCV RBC AUTO: 87.7 FL — SIGNIFICANT CHANGE UP (ref 80–100)
MONOCYTES # BLD AUTO: 0.59 K/UL — SIGNIFICANT CHANGE UP (ref 0–0.9)
MONOCYTES NFR BLD AUTO: 7.7 % — SIGNIFICANT CHANGE UP (ref 2–14)
NEUTROPHILS # BLD AUTO: 3.95 K/UL — SIGNIFICANT CHANGE UP (ref 1.8–7.4)
NEUTROPHILS NFR BLD AUTO: 51.4 % — SIGNIFICANT CHANGE UP (ref 43–77)
NRBC # BLD: 0 /100 WBCS — SIGNIFICANT CHANGE UP (ref 0–0)
PLATELET # BLD AUTO: 353 K/UL — SIGNIFICANT CHANGE UP (ref 150–400)
POTASSIUM SERPL-MCNC: 4.1 MMOL/L — SIGNIFICANT CHANGE UP (ref 3.5–5.3)
POTASSIUM SERPL-SCNC: 4.1 MMOL/L — SIGNIFICANT CHANGE UP (ref 3.5–5.3)
PROT SERPL-MCNC: 7.3 GM/DL — SIGNIFICANT CHANGE UP (ref 6–8.3)
PROTHROM AB SERPL-ACNC: 10.9 SEC — SIGNIFICANT CHANGE UP (ref 10.5–13.4)
RBC # BLD: 4.71 M/UL — SIGNIFICANT CHANGE UP (ref 3.8–5.2)
RBC # FLD: 13.2 % — SIGNIFICANT CHANGE UP (ref 10.3–14.5)
SODIUM SERPL-SCNC: 140 MMOL/L — SIGNIFICANT CHANGE UP (ref 135–145)
WBC # BLD: 7.68 K/UL — SIGNIFICANT CHANGE UP (ref 3.8–10.5)
WBC # FLD AUTO: 7.68 K/UL — SIGNIFICANT CHANGE UP (ref 3.8–10.5)

## 2023-05-12 PROCEDURE — 93010 ELECTROCARDIOGRAM REPORT: CPT

## 2023-05-12 RX ORDER — FOLIC ACID 0.8 MG
2 TABLET ORAL
Qty: 0 | Refills: 0 | DISCHARGE

## 2023-05-12 RX ORDER — DOCUSATE SODIUM 100 MG
1 CAPSULE ORAL
Refills: 0 | DISCHARGE

## 2023-05-12 NOTE — H&P PST ADULT - NSICDXPASTSURGICALHX_GEN_ALL_CORE_FT
PAST SURGICAL HISTORY:  H/O bilateral breast reduction surgery     H/O hand surgery left    H/O laminectomy     History of      History of tonsillectomy and adenoidectomy     History of total right hip replacement     S/P appendectomy     S/P hip arthroscopy     S/P hip replacement, left

## 2023-05-12 NOTE — H&P PST ADULT - NSICDXPROCEDURE_GEN_ALL_CORE_FT
PROCEDURES:  Reverse arthroplasty of right shoulder 12-May-2023 12:04:06  Alhaji Angeles  Biceps tenodesis 12-May-2023 12:04:21  Alhaji Angeles

## 2023-05-12 NOTE — H&P PST ADULT - PROBLEM SELECTOR PLAN 1
Right reverse shoulder arthroplasty biceps tenodesis  labs - cbc,pt/ptt,bmp,t&s,nose cx,ekg  M/C required  preop 3 day hibiclens instruction reviewed and given .instructed on if  nose cx positive use mupuricin 5 days and checklist given  take routine meds DOS with sips of water. avoid NSAID and OTC supplements. verbalized understanding  information on proper nutrition , increase protein and better food choices provided in packet  Anesthesiologist to review PST labs, EKG, required clearances and optimization for surgery.

## 2023-05-12 NOTE — H&P PST ADULT - ASSESSMENT
82F pmhx htn, hld, borderline DM c/o right shoulder pain and decreased ROM 2/2 unilateral primary osteoarthritis here for PST for scheduled Right reverse shoulder arthroplasty biceps tenodesis  CAPRINI SCORE [CLOT]    AGE RELATED RISK FACTORS                                                       MOBILITY RELATED FACTORS  [ ] Age 41-60 years                                            (1 Point)                  [ ] Bed rest                                                        (1 Point)  [ ] Age: 61-74 years                                           (2 Points)                 [ ] Plaster cast                                                   (2 Points)  [x ] Age= 75 years                                              (3 Points)                 [ ] Bed bound for more than 72 hours                 (2 Points)    DISEASE RELATED RISK FACTORS                                               GENDER SPECIFIC FACTORS  [ ] Edema in the lower extremities                       (1 Point)                  [ ] Pregnancy                                                     (1 Point)  [ ] Varicose veins                                               (1 Point)                  [ ] Post-partum < 6 weeks                                   (1 Point)             [x ] BMI > 25 Kg/m2                                            (1 Point)                  [ ] Hormonal therapy  or oral contraception          (1 Point)                 [ ] Sepsis (in the previous month)                        (1 Point)                  [ ] History of pregnancy complications                 (1 point)  [ ] Pneumonia or serious lung disease                                               [ ] Unexplained or recurrent                     (1 Point)           (in the previous month)                               (1 Point)  [ ] Abnormal pulmonary function test                     (1 Point)                 SURGERY RELATED RISK FACTORS  [ ] Acute myocardial infarction                              (1 Point)                 [ ]  Section                                             (1 Point)  [ ] Congestive heart failure (in the previous month)  (1 Point)               [ ] Minor surgery                                                  (1 Point)   [ ] Inflammatory bowel disease                             (1 Point)                 [ ] Arthroscopic surgery                                        (2 Points)  [ ] Central venous access                                      (2 Points)                [ ] General surgery lasting more than 45 minutes   (2 Points)       [ ] Stroke (in the previous month)                          (5 Points)               [ x] Elective arthroplasty                                         (5 Points)                                                                                                                                               HEMATOLOGY RELATED FACTORS                                                 TRAUMA RELATED RISK FACTORS  [ ] Prior episodes of VTE                                     (3 Points)                [ ] Fracture of the hip, pelvis, or leg                       (5 Points)  [ ] Positive family history for VTE                         (3 Points)                 [ ] Acute spinal cord injury (in the previous month)  (5 Points)  [ ] Prothrombin 31701 A                                     (3 Points)                 [ ] Paralysis  (less than 1 month)                             (5 Points)  [ ] Factor V Leiden                                             (3 Points)                  [ ] Multiple Trauma within 1 month                        (5 Points)  [ ] Lupus anticoagulants                                     (3 Points)                                                           [ ] Anticardiolipin antibodies                               (3 Points)                                                       [ ] High homocysteine in the blood                      (3 Points)                                             [ ] Other congenital or acquired thrombophilia      (3 Points)                                                [ ] Heparin induced thrombocytopenia                  (3 Points)                                          Total Score [    9      ]    Caprini Score 0 - 2:  Low Risk, No VTE Prophylaxis required for most patients, encourage ambulation  Caprini Score 3 - 6:  At Risk, pharmacologic VTE prophylaxis is indicated for most patients (in the absence of a contraindication)  Caprini Score Greater than or = 7:  High Risk, pharmacologic VTE prophylaxis is indicated for most patients (in the absence of a contraindication)

## 2023-05-12 NOTE — H&P PST ADULT - HISTORY OF PRESENT ILLNESS
.......79 yo female c/o bilateral shoulder pains - left is worse secondary to arthritis - scheduled for left shoulder arthroplasty    denies recent travels in the past 30 days. No fever, SOB, cough, flu like symptoms or body rash- covid screen   82F pmhx htn, hld, borderline DM c/o right shoulder pain and decreased ROM 2/2 unilateral primary osteoarthritis here for PST for scheduled Right reverse shoulder arthroplasty biceps tenodesis  This patient denies any fever, cough, sob, flu like symptoms or travel outside of the [place] in the past 30 days

## 2023-05-13 LAB
MRSA PCR RESULT.: SIGNIFICANT CHANGE UP
S AUREUS DNA NOSE QL NAA+PROBE: SIGNIFICANT CHANGE UP

## 2023-05-15 LAB — VIT D25+D1,25 OH+D1,25 PNL SERPL-MCNC: 73.6 PG/ML — SIGNIFICANT CHANGE UP (ref 19.9–79.3)

## 2023-05-15 NOTE — ASSESSMENT
[FreeTextEntry1] : Bilateral shoulder DJD. h/o L SA, RCR. \par Now s/p L RSA.\par Completed PT.\par HEP for stretching.\par Increased pain R shoulder.\par Discussed op versus non op tx, including the r/b/a/c of both.\par Discussed rehab and recovery after R RSA.\par Discussed timing, frequency and efficacy of cortisone injections.\par Discussed risks of repeated cortisone injections. \par Discussed trial of visco supplementation.\par She takes Tylenol. \par Will get CT scan with 3D recons to plan for surgery.\par Discussed Exparel nerve block. \par \par The advantages, disadvantages, complications and alternatives of continued non-operative treatment versus operative treatment were discussed with the patient.   We specifically discussed the risks of bleeding, infection, damage to neurovascular structures, failure of wound healing, wound dehiscence, scaring, failure of arthroplasty, need for revision surgery, shoulder pain, shoulder stiffness, shoulder dislocation and complications of surgery and anesthesia in general including deep venous thrombosis, pulmonary embolism, myocardial infarction, stroke, loss of limb and death.  The patient understood and agreed to proceed.\par

## 2023-05-15 NOTE — HISTORY OF PRESENT ILLNESS
[Gradual] : gradual [9] : 9 [4] : 4 [Dull/Aching] : dull/aching [Sharp] : sharp [Stabbing] : stabbing [Frequent] : frequent [Leisure] : leisure [Rest] : rest [Exercising] : exercising [de-identified] :  DOS 11/2/20: L RSA\par \par 5/3/23: Here for follow up. She states her right shoulder is very painful. No new injury but underwent radiation treatment and has pain with position of her arms. She takes tylenol, she tried to avoid NSAIDs.  She has tried activity modification and has tried a home exercise program of stretching over the past several months.  Pain with basic activities, such as bathing, getting dressed, etc, continues.  She had the opposite shoulder replaced, did PT and has also tried the exercises she previously did post operatively with minimal relief.  Lifting and using bands caused more pain. \par \par 3/10/21: Follow up left shoulder. She continues to improve but is stiff. She is benefitting from PT.\par \par 1/27/21: Follow up L shoulder. She has tightness. PT is helping. She is stiff.\par \par 12/16/20: Follow up left shoulder. She is going to PT and improving.\par \par 11/18/20: Here for first PO visit. She has some pain at night, trouble sleeping.\par \par 10/14/20: Follow up R shoulder. Here to review MRI. Today she reports the left is worse than the right side. \par \par MRI RIGHT SHOULDER: Grade 2 partial-thickness bursal sided tear in the anterior leading edge of the supraspinatus  tendon at the footprint spanning 7 mm in AP dimension superimposed on tendinosis. Grade 2 partial-thickness tearing in the superior subscapularis tendon superimposed on tendinosis. Infraspinatus tendinosis. Global degenerative tearing of the labrum. 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 Moderate AC joint arthrosis.\par \par 9/29/20: 79 y/o RHD female with R>L shoulder pain for several years. She has a h/o L RCR. She has had increasing pain over 6 months. She ahs pain at night. She has pain deep, radiating down to the elbow. She has paresthesias down the left arm. She takes Naproxen. She has not had any injections. \par \par PMHx: HTN, HLD, nonsmoker [] : Post Surgical Visit: no [FreeTextEntry1] : right shoulder

## 2023-05-15 NOTE — PHYSICAL EXAM
[Bilateral] : shoulder bilaterally [4 ___] : forward flexion 4[unfilled]/5 [4___] : external rotation 4[unfilled]/5 [] : positive Speed's [FreeTextEntry9] : FE R 100  L 130\par ER R 40    L 30

## 2023-05-15 NOTE — IMAGING
[Bilateral] : shoulder bilaterally [Glenohumeral arthritis] : Glenohumeral arthritis [Components well fixed, in good position] : Components well fixed, in good position [FreeTextEntry1] : L - implants in proper position   R - Advanced HEENA GLORIA

## 2023-05-16 ENCOUNTER — NON-APPOINTMENT (OUTPATIENT)
Age: 83
End: 2023-05-16

## 2023-05-17 ENCOUNTER — APPOINTMENT (OUTPATIENT)
Dept: ORTHOPEDIC SURGERY | Facility: CLINIC | Age: 83
End: 2023-05-17
Payer: MEDICARE

## 2023-05-17 PROCEDURE — L3670: CPT | Mod: RT

## 2023-05-21 ENCOUNTER — TRANSCRIPTION ENCOUNTER (OUTPATIENT)
Age: 83
End: 2023-05-21

## 2023-05-22 ENCOUNTER — TRANSCRIPTION ENCOUNTER (OUTPATIENT)
Age: 83
End: 2023-05-22

## 2023-05-22 ENCOUNTER — INPATIENT (INPATIENT)
Facility: HOSPITAL | Age: 83
LOS: 0 days | Discharge: HOME | End: 2023-05-23
Attending: ORTHOPAEDIC SURGERY | Admitting: ORTHOPAEDIC SURGERY
Payer: MEDICARE

## 2023-05-22 ENCOUNTER — APPOINTMENT (OUTPATIENT)
Dept: ORTHOPEDIC SURGERY | Facility: HOSPITAL | Age: 83
End: 2023-05-22
Payer: MEDICARE

## 2023-05-22 VITALS
TEMPERATURE: 98 F | WEIGHT: 166.67 LBS | OXYGEN SATURATION: 96 % | SYSTOLIC BLOOD PRESSURE: 145 MMHG | RESPIRATION RATE: 12 BRPM | DIASTOLIC BLOOD PRESSURE: 68 MMHG | HEART RATE: 73 BPM | HEIGHT: 67 IN

## 2023-05-22 DIAGNOSIS — Z98.890 OTHER SPECIFIED POSTPROCEDURAL STATES: Chronic | ICD-10-CM

## 2023-05-22 DIAGNOSIS — Z90.49 ACQUIRED ABSENCE OF OTHER SPECIFIED PARTS OF DIGESTIVE TRACT: Chronic | ICD-10-CM

## 2023-05-22 DIAGNOSIS — Z96.641 PRESENCE OF RIGHT ARTIFICIAL HIP JOINT: Chronic | ICD-10-CM

## 2023-05-22 DIAGNOSIS — Z98.891 HISTORY OF UTERINE SCAR FROM PREVIOUS SURGERY: Chronic | ICD-10-CM

## 2023-05-22 DIAGNOSIS — Z96.642 PRESENCE OF LEFT ARTIFICIAL HIP JOINT: Chronic | ICD-10-CM

## 2023-05-22 LAB — GLUCOSE BLDC GLUCOMTR-MCNC: 103 MG/DL — HIGH (ref 70–99)

## 2023-05-22 PROCEDURE — 23472 RECONSTRUCT SHOULDER JOINT: CPT | Mod: RT

## 2023-05-22 PROCEDURE — 73030 X-RAY EXAM OF SHOULDER: CPT | Mod: 26,RT

## 2023-05-22 DEVICE — IMPLANTABLE DEVICE: Type: IMPLANTABLE DEVICE | Site: RIGHT | Status: FUNCTIONAL

## 2023-05-22 DEVICE — SCREW STAR 4.5X15MM: Type: IMPLANTABLE DEVICE | Site: RIGHT | Status: FUNCTIONAL

## 2023-05-22 DEVICE — GLENOSPHERE CONCENTRIC S 5MM: Type: IMPLANTABLE DEVICE | Site: RIGHT | Status: FUNCTIONAL

## 2023-05-22 DEVICE — PLATE GLENOID BASE S: Type: IMPLANTABLE DEVICE | Site: RIGHT | Status: FUNCTIONAL

## 2023-05-22 RX ORDER — ASPIRIN/CALCIUM CARB/MAGNESIUM 324 MG
325 TABLET ORAL DAILY
Refills: 0 | Status: DISCONTINUED | OUTPATIENT
Start: 2023-05-23 | End: 2023-05-23

## 2023-05-22 RX ORDER — LOSARTAN POTASSIUM 100 MG/1
100 TABLET, FILM COATED ORAL DAILY
Refills: 0 | Status: DISCONTINUED | OUTPATIENT
Start: 2023-05-22 | End: 2023-05-23

## 2023-05-22 RX ORDER — NYSTATIN CREAM 100000 [USP'U]/G
1 CREAM TOPICAL
Refills: 0 | Status: DISCONTINUED | OUTPATIENT
Start: 2023-05-22 | End: 2023-05-23

## 2023-05-22 RX ORDER — ONDANSETRON 8 MG/1
8 TABLET, FILM COATED ORAL EVERY 8 HOURS
Refills: 0 | Status: DISCONTINUED | OUTPATIENT
Start: 2023-05-22 | End: 2023-05-23

## 2023-05-22 RX ORDER — FAMOTIDINE 10 MG/ML
20 INJECTION INTRAVENOUS EVERY 12 HOURS
Refills: 0 | Status: DISCONTINUED | OUTPATIENT
Start: 2023-05-22 | End: 2023-05-23

## 2023-05-22 RX ORDER — ATORVASTATIN CALCIUM 80 MG/1
20 TABLET, FILM COATED ORAL AT BEDTIME
Refills: 0 | Status: DISCONTINUED | OUTPATIENT
Start: 2023-05-22 | End: 2023-05-23

## 2023-05-22 RX ORDER — ATORVASTATIN CALCIUM 80 MG/1
20 TABLET, FILM COATED ORAL
Qty: 0 | Refills: 0 | DISCHARGE

## 2023-05-22 RX ORDER — ACETAMINOPHEN 500 MG
1000 TABLET ORAL ONCE
Refills: 0 | Status: COMPLETED | OUTPATIENT
Start: 2023-05-22 | End: 2024-04-19

## 2023-05-22 RX ORDER — SODIUM CHLORIDE 9 MG/ML
1000 INJECTION, SOLUTION INTRAVENOUS
Refills: 0 | Status: DISCONTINUED | OUTPATIENT
Start: 2023-05-22 | End: 2023-05-22

## 2023-05-22 RX ORDER — SENNA PLUS 8.6 MG/1
2 TABLET ORAL AT BEDTIME
Refills: 0 | Status: DISCONTINUED | OUTPATIENT
Start: 2023-05-22 | End: 2023-05-23

## 2023-05-22 RX ORDER — FENTANYL CITRATE 50 UG/ML
25 INJECTION INTRAVENOUS
Refills: 0 | Status: DISCONTINUED | OUTPATIENT
Start: 2023-05-22 | End: 2023-05-22

## 2023-05-22 RX ORDER — ACETAMINOPHEN 500 MG
1000 TABLET ORAL ONCE
Refills: 0 | Status: COMPLETED | OUTPATIENT
Start: 2023-05-22 | End: 2023-05-22

## 2023-05-22 RX ORDER — SODIUM CHLORIDE 9 MG/ML
1000 INJECTION INTRAMUSCULAR; INTRAVENOUS; SUBCUTANEOUS
Refills: 0 | Status: DISCONTINUED | OUTPATIENT
Start: 2023-05-22 | End: 2023-05-23

## 2023-05-22 RX ORDER — KETOROLAC TROMETHAMINE 30 MG/ML
30 SYRINGE (ML) INJECTION
Refills: 0 | Status: DISCONTINUED | OUTPATIENT
Start: 2023-05-22 | End: 2023-05-23

## 2023-05-22 RX ORDER — ONDANSETRON 8 MG/1
4 TABLET, FILM COATED ORAL ONCE
Refills: 0 | Status: DISCONTINUED | OUTPATIENT
Start: 2023-05-22 | End: 2023-05-22

## 2023-05-22 RX ORDER — OXYCODONE HYDROCHLORIDE 5 MG/1
5 TABLET ORAL
Refills: 0 | Status: DISCONTINUED | OUTPATIENT
Start: 2023-05-22 | End: 2023-05-23

## 2023-05-22 RX ORDER — SODIUM CHLORIDE 9 MG/ML
3 INJECTION INTRAMUSCULAR; INTRAVENOUS; SUBCUTANEOUS EVERY 8 HOURS
Refills: 0 | Status: DISCONTINUED | OUTPATIENT
Start: 2023-05-22 | End: 2023-05-22

## 2023-05-22 RX ORDER — OXYCODONE HYDROCHLORIDE 5 MG/1
10 TABLET ORAL
Refills: 0 | Status: DISCONTINUED | OUTPATIENT
Start: 2023-05-22 | End: 2023-05-23

## 2023-05-22 RX ORDER — CEFAZOLIN SODIUM 1 G
2000 VIAL (EA) INJECTION EVERY 8 HOURS
Refills: 0 | Status: COMPLETED | OUTPATIENT
Start: 2023-05-22 | End: 2023-05-23

## 2023-05-22 RX ORDER — BENZOCAINE AND MENTHOL 5; 1 G/100ML; G/100ML
1 LIQUID ORAL THREE TIMES A DAY
Refills: 0 | Status: DISCONTINUED | OUTPATIENT
Start: 2023-05-22 | End: 2023-05-23

## 2023-05-22 RX ADMIN — SODIUM CHLORIDE 75 MILLILITER(S): 9 INJECTION, SOLUTION INTRAVENOUS at 12:34

## 2023-05-22 RX ADMIN — Medication 100 MILLIGRAM(S): at 17:09

## 2023-05-22 RX ADMIN — FENTANYL CITRATE 25 MICROGRAM(S): 50 INJECTION INTRAVENOUS at 12:32

## 2023-05-22 RX ADMIN — FENTANYL CITRATE 25 MICROGRAM(S): 50 INJECTION INTRAVENOUS at 12:54

## 2023-05-22 RX ADMIN — OXYCODONE HYDROCHLORIDE 10 MILLIGRAM(S): 5 TABLET ORAL at 20:36

## 2023-05-22 RX ADMIN — FENTANYL CITRATE 25 MICROGRAM(S): 50 INJECTION INTRAVENOUS at 13:16

## 2023-05-22 RX ADMIN — Medication 400 MILLIGRAM(S): at 20:36

## 2023-05-22 RX ADMIN — SENNA PLUS 2 TABLET(S): 8.6 TABLET ORAL at 21:24

## 2023-05-22 RX ADMIN — SODIUM CHLORIDE 100 MILLILITER(S): 9 INJECTION INTRAMUSCULAR; INTRAVENOUS; SUBCUTANEOUS at 14:22

## 2023-05-22 RX ADMIN — OXYCODONE HYDROCHLORIDE 10 MILLIGRAM(S): 5 TABLET ORAL at 19:43

## 2023-05-22 RX ADMIN — NYSTATIN CREAM 1 APPLICATION(S): 100000 CREAM TOPICAL at 17:09

## 2023-05-22 RX ADMIN — Medication 1000 MILLIGRAM(S): at 21:12

## 2023-05-22 RX ADMIN — SODIUM CHLORIDE 100 MILLILITER(S): 9 INJECTION INTRAMUSCULAR; INTRAVENOUS; SUBCUTANEOUS at 20:37

## 2023-05-22 RX ADMIN — Medication 30 MILLIGRAM(S): at 17:24

## 2023-05-22 RX ADMIN — Medication 30 MILLIGRAM(S): at 17:09

## 2023-05-22 RX ADMIN — FAMOTIDINE 20 MILLIGRAM(S): 10 INJECTION INTRAVENOUS at 17:09

## 2023-05-22 RX ADMIN — FENTANYL CITRATE 25 MICROGRAM(S): 50 INJECTION INTRAVENOUS at 13:10

## 2023-05-22 RX ADMIN — FENTANYL CITRATE 25 MICROGRAM(S): 50 INJECTION INTRAVENOUS at 12:43

## 2023-05-22 NOTE — OCCUPATIONAL THERAPY INITIAL EVALUATION ADULT - ADL RETRAINING, OT EVAL
Pt will perform upper body dressing with Supervision in 3 weeks. Pt will perform UB dressing with Supervision in 3 weeks.

## 2023-05-22 NOTE — DISCHARGE NOTE PROVIDER - NSDCCPTREATMENT_GEN_ALL_CORE_FT
PRINCIPAL PROCEDURE  Procedure: Reverse total shoulder arthroplasty  Findings and Treatment: right

## 2023-05-22 NOTE — OCCUPATIONAL THERAPY INITIAL EVALUATION ADULT - BALANCE TRAINING, PT EVAL
Pt will improve dynamic standing balance to Good to facilitate safe functional transfers within 2 weeks.

## 2023-05-22 NOTE — DISCHARGE NOTE PROVIDER - NSDCCPCAREPLAN_GEN_ALL_CORE_FT
PRINCIPAL DISCHARGE DIAGNOSIS  Diagnosis: Osteoarthritis of right shoulder  Assessment and Plan of Treatment:

## 2023-05-22 NOTE — OCCUPATIONAL THERAPY INITIAL EVALUATION ADULT - GENERAL OBSERVATIONS, REHAB EVAL
Pt encountered in bedside recliner with B/L LE's elevated, NAD, shoulder immobilizer sling in place, and + IV infusing ( removed prior to start of eval).

## 2023-05-22 NOTE — DISCHARGE NOTE PROVIDER - HOSPITAL COURSE
82yFemale with history of osteoarthritis of right shoulder presenting for right RSA by Dr. Earl Esquivel on 5/22/23. Risk and benefits of surgery were explained to the patient. The patient understood and agreed to proceed with surgery. Patient underwent the procedure with no intraoperative complications. Pt was brought in stable condition to the PACU. Once stable in PACU, pt was brought to the floor. During hospital stay pt was followed by Medicine,  during this admission. Pt hospital course was XX. Pt is stable for discharge to XX on POD# 82yFemale with history of osteoarthritis of right shoulder presenting for right RSA by Dr. Earl Esquivel on 5/22/23. Risk and benefits of surgery were explained to the patient. The patient understood and agreed to proceed with surgery. Patient underwent the procedure with no intraoperative complications. Pt was brought in stable condition to the PACU. Once stable in PACU, pt was brought to the floor. During hospital stay pt was followed by Medicine,  during this admission. Pt hospital course was unremarkable. Pt is stable for discharge to home on POD# 1.

## 2023-05-22 NOTE — DISCHARGE NOTE PROVIDER - CARE PROVIDER_API CALL
Earl Esquivel)  Orthopaedic Surgery  [address] [place] [address]  Phone: [phone]  Fax: [phone]  Follow Up Time:

## 2023-05-22 NOTE — DISCHARGE NOTE PROVIDER - NSDCMRMEDTOKEN_GEN_ALL_CORE_FT
ascorbic acid 500 mg oral tablet: 1 tab(s) orally 2 times a day  aspirin 81 mg oral capsule: 1 orally once a day  atorvastatin: 20 milligram(s) orally once a day  Caltrate 600 + D oral tablet: 1 tab(s) orally 2 times a day  docusate potassium 100 mg oral capsule: orally once a day  losartan: 100 milligram(s) orally once a day  Multiple Vitamins oral tablet: 1 tab(s) orally once a day  Vitamin B-12 1000 mcg oral tablet: 1 tab(s) orally once a day   Adult Aspirin 325 mg oral tablet: 1 tab(s) orally once a day MDD: 1  ascorbic acid 500 mg oral tablet: 1 tab(s) orally 2 times a day  atorvastatin: 20 milligram(s) orally once a day  losartan 100 mg oral tablet: 1 tab(s) orally once a day  Multiple Vitamins oral tablet: 1 tab(s) orally once a day  Narcan 4 mg/0.1 mL nasal spray: 4 milligram(s) intranasally once , repeat as necessary.   As needed. For suspected opiate overdose   Follow instructions on packet MDD: 0.2 ml  oxyCODONE 5 mg oral tablet: 1 tab(s) orally every 4 hours as needed for  pain 1 tab for mild/moderate pain, 2 tabs for severe pain MDD: 6  senna leaf extract oral tablet: 2 tab(s) orally once a day (at bedtime)  Tylenol 325 mg oral tablet: 2 orally every 4 hours as needed for  pain

## 2023-05-22 NOTE — DISCHARGE NOTE PROVIDER - NSDCQMCOGNITION_NEU_ALL_CORE
[facility] Emergency Department  [address]  [facility] [address]  Phone:  [phone]  Fax:  [phone]                                    Yasmine Smith   MRN: [id number]    Department:  [facility] Emergency Department   Date of Visit:  12/14/2018           After Visit Summary Signature Page    I have received my discharge instructions, and my questions have been answered. I have discussed any challenges I see with this plan with the nurse or doctor.    ..........................................................................................................................................  Patient/Patient Representative Signature      ..........................................................................................................................................  Patient Representative Print Name and Relationship to Patient    ..................................................               ................................................  Date                                   Time    ..........................................................................................................................................  Reviewed by Signature/Title    ...................................................              ..............................................  Date                                               Time          22EPIC Rev 08/18       
No difficulties

## 2023-05-22 NOTE — PATIENT PROFILE ADULT - FALL HARM RISK - HARM RISK INTERVENTIONS

## 2023-05-22 NOTE — DISCHARGE NOTE PROVIDER - NSDCFUSCHEDAPPT_GEN_ALL_CORE_FT
Earl Esquivel  [facility]  [address]  Scheduled Appointment: 06/07/2023

## 2023-05-22 NOTE — DISCHARGE NOTE PROVIDER - NSDCFUADDAPPT_GEN_ALL_CORE_FT
Follow up with your surgeon in two weeks. Call for appointment.    If you need more pain medication, call your surgeon's office. For medication refills or authorizations, please call [phone]    We recommend that you call and schedule a follow up appointment with your primary care physician for repeat blood work (CBC and BMP) for post hospital discharge follow-up care 2-4 weeks after your surgery.     Make sure to have a bowel movement by 2 days after surgery. Take stool softeners and laxatives as needed.     Call your surgeon if you have increased redness/pain/drainage or fever. Return to ER for shortness of breath/calf tenderness.

## 2023-05-22 NOTE — ASU PREOP CHECKLIST - TO WHOM
Left message to contact PCP. Your test came back detected/positive for Covid-19. What happens if I have a positive test?  If you have symptoms:  Isolate until all three of these things are true: 1) your symptoms are better, 2) it has been 10 days since you first felt sick, and 3) you have had no fever for at least 24 hours without using medicine that lowers fever. Drink plenty of fluids and eat when you can. You may take medicine for pain or fever if you need to. Rest as much as you can. If you do not have symptoms:  Stay home for 10 days after the date you were tested. If you develop symptoms during those 10 days, stay home until all three of these things are true: 1) your symptoms are better, 2) it has been 10 days since you first felt sick, and 3) you have had no fever for at least 24 hours without using medicine that lowers fever. Follow care instructions from your doctor or other healthcare provider. Seek emergency medical care immediately if you have trouble breathing, persistent pain or pressure in the chest, new confusion, inability to wake or stay awake, or bluish lips or face. Someone from the health department (case investigator or contact tracer) may reach out to you to check on your health and ask about other people you have been around or where you've spent time while you may have been able to spread COVID-19 to others. This person's role is strictly to map the virus to help identify people who may have been exposed to the virus and prevent its spread. The local health department will also provide guidance on how to stay safely at home to avoid spreading illness. Detected results can happen for months and you are not considered contagious. No retest is necessary.
ISIAH Harrell RN

## 2023-05-22 NOTE — CONSULT NOTE ADULT - SUBJECTIVE AND OBJECTIVE BOX
LILLIAN DENIS is a 82y Female s/p RIGHT REVERSE SHOULDER ARTHROPLASTY BICEPS TENODESIS      w/ h/o HTN (hypertension)    HLD (hyperlipidemia)      denies any chest pain shortness of breath palpitation dizziness lightheadedness nausea vomiting fever or chills    History of     S/P appendectomy    H/O laminectomy    History of tonsillectomy and adenoidectomy    H/O hand surgery    H/O bilateral breast reduction surgery    History of total right hip replacement    S/P hip arthroscopy    S/P hip replacement, left        SH: doesnot smoke or drink at this time    No Known Allergies    atorvastatin 20 milliGRAM(s) Oral at bedtime  benzocaine/menthol Lozenge 1 Lozenge Oral three times a day PRN  ceFAZolin   IVPB 2000 milliGRAM(s) IV Intermittent every 8 hours  famotidine    Tablet 20 milliGRAM(s) Oral every 12 hours  ketorolac   Injectable 30 milliGRAM(s) IV Push two times a day  losartan 100 milliGRAM(s) Oral daily  nystatin Powder 1 Application(s) Topical two times a day  ondansetron Injectable 8 milliGRAM(s) IV Push every 8 hours PRN  oxyCODONE    IR 10 milliGRAM(s) Oral every 3 hours PRN  oxyCODONE    IR 5 milliGRAM(s) Oral every 3 hours PRN  senna 2 Tablet(s) Oral at bedtime  sodium chloride 0.9%. 1000 milliLiter(s) IV Continuous <Continuous>    T(C): 37.1 (23 @ 21:10), Max: 37.1 (23 @ 21:10)  HR: 80 (23 @ 21:10) (70 - 97)  BP: 129/70 (23 @ 21:10) (117/68 - 159/74)  RR: 18 (23 @ 21:10) (12 - 23)  SpO2: 96% (23 @ 21:10) (92% - 97%)  HEENT unremarkable  neck no JVD or bruit  heart normal S1 S2 RRR no gallops or rubs  chest clear to auscultation  abd sof nontender non distended +bs  ext no calf tenderness    A/P   DVT PX  pain control  bowel regimen   wound care as per ortho  GI PX  antiemetics prn  incentive spirometerH

## 2023-05-23 ENCOUNTER — TRANSCRIPTION ENCOUNTER (OUTPATIENT)
Age: 83
End: 2023-05-23

## 2023-05-23 VITALS
TEMPERATURE: 99 F | DIASTOLIC BLOOD PRESSURE: 70 MMHG | HEART RATE: 80 BPM | OXYGEN SATURATION: 98 % | SYSTOLIC BLOOD PRESSURE: 117 MMHG | RESPIRATION RATE: 18 BRPM

## 2023-05-23 LAB
ANION GAP SERPL CALC-SCNC: 5 MMOL/L — SIGNIFICANT CHANGE UP (ref 5–17)
BUN SERPL-MCNC: 17 MG/DL — SIGNIFICANT CHANGE UP (ref 7–23)
CALCIUM SERPL-MCNC: 8 MG/DL — LOW (ref 8.5–10.1)
CHLORIDE SERPL-SCNC: 108 MMOL/L — SIGNIFICANT CHANGE UP (ref 96–108)
CO2 SERPL-SCNC: 27 MMOL/L — SIGNIFICANT CHANGE UP (ref 22–31)
CREAT SERPL-MCNC: 0.7 MG/DL — SIGNIFICANT CHANGE UP (ref 0.5–1.3)
EGFR: 87 ML/MIN/1.73M2 — SIGNIFICANT CHANGE UP
GLUCOSE SERPL-MCNC: 138 MG/DL — HIGH (ref 70–99)
HCT VFR BLD CALC: 34 % — LOW (ref 34.5–45)
HGB BLD-MCNC: 11 G/DL — LOW (ref 11.5–15.5)
MCHC RBC-ENTMCNC: 28.4 PG — SIGNIFICANT CHANGE UP (ref 27–34)
MCHC RBC-ENTMCNC: 32.4 G/DL — SIGNIFICANT CHANGE UP (ref 32–36)
MCV RBC AUTO: 87.9 FL — SIGNIFICANT CHANGE UP (ref 80–100)
NRBC # BLD: 0 /100 WBCS — SIGNIFICANT CHANGE UP (ref 0–0)
PLATELET # BLD AUTO: 273 K/UL — SIGNIFICANT CHANGE UP (ref 150–400)
POTASSIUM SERPL-MCNC: 3.8 MMOL/L — SIGNIFICANT CHANGE UP (ref 3.5–5.3)
POTASSIUM SERPL-SCNC: 3.8 MMOL/L — SIGNIFICANT CHANGE UP (ref 3.5–5.3)
RBC # BLD: 3.87 M/UL — SIGNIFICANT CHANGE UP (ref 3.8–5.2)
RBC # FLD: 13.6 % — SIGNIFICANT CHANGE UP (ref 10.3–14.5)
SODIUM SERPL-SCNC: 140 MMOL/L — SIGNIFICANT CHANGE UP (ref 135–145)
WBC # BLD: 11.22 K/UL — HIGH (ref 3.8–10.5)
WBC # FLD AUTO: 11.22 K/UL — HIGH (ref 3.8–10.5)

## 2023-05-23 PROCEDURE — 73030 X-RAY EXAM OF SHOULDER: CPT | Mod: 26,RT

## 2023-05-23 RX ORDER — ACETAMINOPHEN 500 MG
2 TABLET ORAL
Qty: 0 | Refills: 0 | DISCHARGE

## 2023-05-23 RX ORDER — DOCUSATE SODIUM 100 MG
0 CAPSULE ORAL
Refills: 0 | DISCHARGE

## 2023-05-23 RX ORDER — ACETAMINOPHEN 500 MG
1000 TABLET ORAL ONCE
Refills: 0 | Status: COMPLETED | OUTPATIENT
Start: 2023-05-23 | End: 2024-04-20

## 2023-05-23 RX ORDER — LOSARTAN POTASSIUM 100 MG/1
100 TABLET, FILM COATED ORAL
Qty: 0 | Refills: 0 | DISCHARGE

## 2023-05-23 RX ORDER — NALOXONE HYDROCHLORIDE 4 MG/.1ML
4 SPRAY NASAL
Qty: 1 | Refills: 0
Start: 2023-05-23 | End: 2023-05-23

## 2023-05-23 RX ORDER — ASPIRIN/CALCIUM CARB/MAGNESIUM 324 MG
1 TABLET ORAL
Qty: 14 | Refills: 0
Start: 2023-05-23 | End: 2023-06-05

## 2023-05-23 RX ORDER — ACETAMINOPHEN 500 MG
1000 TABLET ORAL ONCE
Refills: 0 | Status: DISCONTINUED | OUTPATIENT
Start: 2023-05-23 | End: 2023-05-23

## 2023-05-23 RX ORDER — ASPIRIN/CALCIUM CARB/MAGNESIUM 324 MG
1 TABLET ORAL
Refills: 0 | DISCHARGE

## 2023-05-23 RX ORDER — PREGABALIN 225 MG/1
1 CAPSULE ORAL
Qty: 0 | Refills: 0 | DISCHARGE

## 2023-05-23 RX ORDER — OXYCODONE HYDROCHLORIDE 5 MG/1
1 TABLET ORAL
Qty: 42 | Refills: 0
Start: 2023-05-23 | End: 2023-05-29

## 2023-05-23 RX ORDER — SENNA PLUS 8.6 MG/1
2 TABLET ORAL
Qty: 0 | Refills: 0 | DISCHARGE
Start: 2023-05-23

## 2023-05-23 RX ORDER — LOSARTAN POTASSIUM 100 MG/1
1 TABLET, FILM COATED ORAL
Qty: 0 | Refills: 0 | DISCHARGE
Start: 2023-05-23

## 2023-05-23 RX ADMIN — LOSARTAN POTASSIUM 100 MILLIGRAM(S): 100 TABLET, FILM COATED ORAL at 05:30

## 2023-05-23 RX ADMIN — OXYCODONE HYDROCHLORIDE 10 MILLIGRAM(S): 5 TABLET ORAL at 14:33

## 2023-05-23 RX ADMIN — Medication 30 MILLIGRAM(S): at 05:22

## 2023-05-23 RX ADMIN — OXYCODONE HYDROCHLORIDE 10 MILLIGRAM(S): 5 TABLET ORAL at 01:30

## 2023-05-23 RX ADMIN — OXYCODONE HYDROCHLORIDE 10 MILLIGRAM(S): 5 TABLET ORAL at 10:02

## 2023-05-23 RX ADMIN — OXYCODONE HYDROCHLORIDE 10 MILLIGRAM(S): 5 TABLET ORAL at 05:25

## 2023-05-23 RX ADMIN — Medication 325 MILLIGRAM(S): at 12:05

## 2023-05-23 RX ADMIN — OXYCODONE HYDROCHLORIDE 10 MILLIGRAM(S): 5 TABLET ORAL at 00:34

## 2023-05-23 RX ADMIN — OXYCODONE HYDROCHLORIDE 10 MILLIGRAM(S): 5 TABLET ORAL at 15:30

## 2023-05-23 RX ADMIN — OXYCODONE HYDROCHLORIDE 10 MILLIGRAM(S): 5 TABLET ORAL at 09:04

## 2023-05-23 RX ADMIN — FAMOTIDINE 20 MILLIGRAM(S): 10 INJECTION INTRAVENOUS at 05:23

## 2023-05-23 RX ADMIN — SODIUM CHLORIDE 100 MILLILITER(S): 9 INJECTION INTRAMUSCULAR; INTRAVENOUS; SUBCUTANEOUS at 05:33

## 2023-05-23 RX ADMIN — Medication 30 MILLIGRAM(S): at 05:40

## 2023-05-23 RX ADMIN — ATORVASTATIN CALCIUM 20 MILLIGRAM(S): 80 TABLET, FILM COATED ORAL at 05:23

## 2023-05-23 RX ADMIN — Medication 100 MILLIGRAM(S): at 00:35

## 2023-05-23 RX ADMIN — OXYCODONE HYDROCHLORIDE 10 MILLIGRAM(S): 5 TABLET ORAL at 06:06

## 2023-05-23 NOTE — PROGRESS NOTE ADULT - SUBJECTIVE AND OBJECTIVE BOX
82yFemale s/p R RSA POD#1. Pt seen and examined in NAD. Pain controlled. Pt denies any new complaints. Pt denies CP/SOB/N/V/D/numbness/tingling/bowel or bladder dysfunction.     PE:   Neuro: AAOX3  Abd: soft, ND  RUE: Sling in place. Prineo dressing C/D/I.  +ROM  wrist/fingers. +ok/thumbsup/fingercross signs.  strength: 5/5.  RP2+ NVI  LUE: Skin intact. +ROM shoulder/elbow/wrist/fingers. +ok/thumbsup/fingercross signs.  strength: 5/5.  RP2+ NVI.   B/L LE: Skin intact. +ROM hip/knee/ankle/toes. Ankle Dorsi/plantarflexion: 5/5. Calf: soft, compressible and nontender. DP/PT 2+ NVI.                             11.0   11.22 )-----------( 273      ( 23 May 2023 10:55 )             34.0       05-23    140  |  108  |  17  ----------------------------<  138<H>  3.8   |  27  |  0.70    Ca    8.0<L>      23 May 2023 10:55          A/P: 82yFemale s/p R RSA POD#1  Post op Xrays reviewed   Pain controlled  PT/OT: NWB RUE no ROM to shoulder   DVT ppx: SCDs and asa 325mg  Wound care, Isometric exercises, incentive spirometry   Medical consult appreciated  Discharge: planning for home today  All the above discussed and understood by pt   D/W DR Esquivel
82yFemale s/p R RSA POD#0. Pt seen and examined in NAD. Pain controlled. Pt denies any new complaints. Pt denies CP/SOB/N/V/D/numbness/tingling/bowel or bladder dysfunction. C/O burning sensation right axilla.     PE:   Neuro: AAOX3  RUE: Sling in place. Prineo dressing C/D/I. +ROM wrist/fingers. +ok/thumbsup/fingercross signs.  strength: 5/5.  RP2+ NVI  LUE: Skin intact. +ROM shoulder/elbow/wrist/fingers. +ok/thumbsup/fingercross signs.  strength: 5/5.  RP2+ NVI.   B/L LE: Skin intact. +ROM hip/knee/ankle/toes. Ankle Dorsi/plantarflexion: 5/5. Calf: soft, compressible and nontender. DP/PT 2+ NVI.           A/P: 82yFemale s/p R RSA POD#0.   Axillary complaints - nystatin powder BID  Pain control reviewed   F/U Post op Xray  PT/OT: NWB RUE no ROM to shoulder   DVT ppx: SCDs and asa 325mg daily  Wound care, Isometric exercises, incentive spirometry   Medical consult appreciated  Discharge: planning for home tomorrow  All the above discussed and understood by pt   
LILLIAN DENIS is a 82y Female s/p RIGHT REVERSE SHOULDER ARTHROPLASTY BICEPS TENODESIS        denies any chest pain shortness of breath palpitation dizziness lightheadedness nausea vomiting fever or chills    T(C): 37.2 (05-23-23 @ 11:54), Max: 37.2 (05-23-23 @ 11:54)  HR: 80 (05-23-23 @ 11:54) (70 - 97)  BP: 117/70 (05-23-23 @ 11:54) (115/66 - 159/74)  RR: 18 (05-23-23 @ 11:54) (16 - 23)  SpO2: 98% (05-23-23 @ 11:54) (92% - 99%)  no jvd/bruit  s1 s2 rrr  cta  s/nt/nd  no calf tend                        11.0   11.22 )-----------( 273      ( 23 May 2023 10:55 )             34.0   05-23    140  |  108  |  17  ----------------------------<  138<H>  3.8   |  27  |  0.70    Ca    8.0<L>      23 May 2023 10:55        cont dvt px  pain control  bowel regimen  antiemetics  incentive spirometer

## 2023-05-23 NOTE — DISCHARGE NOTE NURSING/CASE MANAGEMENT/SOCIAL WORK - NSDCPEFALRISK_GEN_ALL_CORE
For information on Fall & Injury Prevention, visit: [email or web address] OR  [email or web address] OR  https://www.cdc.gov/steadi/patient.html

## 2023-05-23 NOTE — DISCHARGE NOTE NURSING/CASE MANAGEMENT/SOCIAL WORK - NSDCFUADDAPPT_GEN_ALL_CORE_FT
To nursing, my recommendations--but she is welcome to also get cardiology's recommendation. Reduce furosemide 40 mg once daily. Continue metolazone 2.5 mg Monday Wednesday Friday as this is what cardiologist has recommended.   Take potassium chloride 20 m Follow up with your surgeon in two weeks. Call for appointment.    If you need more pain medication, call your surgeon's office. For medication refills or authorizations, please call [phone]    We recommend that you call and schedule a follow up appointment with your primary care physician for repeat blood work (CBC and BMP) for post hospital discharge follow-up care 2-4 weeks after your surgery.     Make sure to have a bowel movement by 2 days after surgery. Take stool softeners and laxatives as needed.     Call your surgeon if you have increased redness/pain/drainage or fever. Return to ER for shortness of breath/calf tenderness.

## 2023-05-23 NOTE — DISCHARGE NOTE NURSING/CASE MANAGEMENT/SOCIAL WORK - PATIENT PORTAL LINK FT
You can access the FollowMyHealth Patient Portal offered by [facility] by registering at the following website: [email or web address]. By joining [facility]’s FollowMyHealth portal, you will also be able to view your health information using other applications (apps) compatible with our system.

## 2023-05-26 DIAGNOSIS — Z96.643 PRESENCE OF ARTIFICIAL HIP JOINT, BILATERAL: ICD-10-CM

## 2023-05-26 DIAGNOSIS — I10 ESSENTIAL (PRIMARY) HYPERTENSION: ICD-10-CM

## 2023-05-26 DIAGNOSIS — M19.011 PRIMARY OSTEOARTHRITIS, RIGHT SHOULDER: ICD-10-CM

## 2023-05-26 DIAGNOSIS — E78.5 HYPERLIPIDEMIA, UNSPECIFIED: ICD-10-CM

## 2023-06-07 ENCOUNTER — APPOINTMENT (OUTPATIENT)
Dept: ORTHOPEDIC SURGERY | Facility: CLINIC | Age: 83
End: 2023-06-07
Payer: MEDICARE

## 2023-06-07 VITALS — HEIGHT: 67 IN | BODY MASS INDEX: 25.27 KG/M2 | WEIGHT: 161 LBS

## 2023-06-07 PROCEDURE — 73030 X-RAY EXAM OF SHOULDER: CPT | Mod: RT

## 2023-06-07 PROCEDURE — 99024 POSTOP FOLLOW-UP VISIT: CPT

## 2023-06-07 RX ORDER — OXYCODONE 5 MG/1
5 TABLET ORAL TWICE DAILY
Qty: 14 | Refills: 0 | Status: ACTIVE | COMMUNITY
Start: 2023-06-07 | End: 1900-01-01

## 2023-06-07 NOTE — IMAGING
[Right] : right shoulder [No loss of surgical correlation. Bony alignment acceptable. Hardware in appropriate position] : No loss of surgical correlation. Bony alignment acceptable. Hardware in appropriate position [Components well fixed, in good position] : Components well fixed, in good position

## 2023-06-07 NOTE — ASSESSMENT
[FreeTextEntry1] : Bilateral shoulder DJD. h/o L SA, RCR. \par Now s/p L RSA.\par Completed PT.\par HEP for stretching.\par \par Now s/p R RSA\par Xrays reviewed.\par D/c sling.\par PT for PROM in FE/ABD/ER.\par OK for light ADLs, but 1 lb WB.\par No IR.\par RTO 4 weeks.\par \par

## 2023-06-07 NOTE — HISTORY OF PRESENT ILLNESS
[9] : 9 [7] : 7 [Dull/Aching] : dull/aching [Sharp] : sharp [de-identified] :  DOS 11/2/20: L RSA\par DOS 5/22/23: R RSA\par \par 6/7/23: Here for first PO visit. She is in sling. There is some numbness in her forearm. She removed the dressing herself.\par \par 5/3/23: Here for follow up. She states her right shoulder is very painful. No new injury but underwent radiation treatment and has pain with position of her arms. She takes tylenol, she tried to avoid NSAIDs.  She has tried activity modification and has tried a home exercise program of stretching over the past several months.  Pain with basic activities, such as bathing, getting dressed, etc, continues.  She had the opposite shoulder replaced, did PT and has also tried the exercises she previously did post operatively with minimal relief.  Lifting and using bands caused more pain. \par \par 3/10/21: Follow up left shoulder. She continues to improve but is stiff. She is benefitting from PT.\par \par 1/27/21: Follow up L shoulder. She has tightness. PT is helping. She is stiff.\par \par 12/16/20: Follow up left shoulder. She is going to PT and improving.\par \par 11/18/20: Here for first PO visit. She has some pain at night, trouble sleeping.\par \par 10/14/20: Follow up R shoulder. Here to review MRI. Today she reports the left is worse than the right side. \par \par MRI RIGHT SHOULDER: Grade 2 partial-thickness bursal sided tear in the anterior leading edge of the supraspinatus  tendon at the footprint spanning 7 mm in AP dimension superimposed on tendinosis. Grade 2 partial-thickness tearing in the superior subscapularis tendon superimposed on tendinosis. Infraspinatus tendinosis. Global degenerative tearing of the labrum. 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 Moderate AC joint arthrosis.\par \par 9/29/20: 81 y/o RHD female with R>L shoulder pain for several years. She has a h/o L RCR. She has had increasing pain over 6 months. She ahs pain at night. She has pain deep, radiating down to the elbow. She has paresthesias down the left arm. She takes Naproxen. She has not had any injections. \par \par PMHx: HTN, HLD, nonsmoker [] : This patient has had an injection before: no [FreeTextEntry1] : right shoulder [de-identified] : 5/22/23

## 2023-06-25 NOTE — DISCHARGE NOTE PROVIDER - NS AS DC PROVIDER CONTACT Y/N MULTI
Nasal mucosa clear. oropharynx clear, Mucous membranes fairly dry, Throat has no vesicles, no oropharyngeal exudates and uvula is midline.
Yes

## 2023-07-05 ENCOUNTER — APPOINTMENT (OUTPATIENT)
Dept: ORTHOPEDIC SURGERY | Facility: CLINIC | Age: 83
End: 2023-07-05
Payer: MEDICARE

## 2023-07-05 VITALS — WEIGHT: 161 LBS | BODY MASS INDEX: 25.27 KG/M2 | HEIGHT: 67 IN

## 2023-07-05 PROCEDURE — 99024 POSTOP FOLLOW-UP VISIT: CPT

## 2023-07-05 NOTE — REASON FOR VISIT
[FreeTextEntry2] : post op right shoulder. Pain is better. Has been continuing physical therapy 2x a week

## 2023-07-05 NOTE — ASSESSMENT
[FreeTextEntry1] : Bilateral shoulder DJD. h/o L SA, RCR. \par Now s/p L RSA.\par Completed PT.\par HEP for stretching.\par \par Now s/p R RSA\par PT for PROM, AROM as tolerated.\par Overhead pulley.\par OK for IR.\par Ok for light biceps/triceps strengthening.\par 5 lb WB limit\par RTO 6 weeks with xrays.\par \par

## 2023-07-05 NOTE — HISTORY OF PRESENT ILLNESS
[9] : 9 [7] : 7 [Dull/Aching] : dull/aching [Sharp] : sharp [de-identified] :  DOS 11/2/20: L RSA\par DOS 5/22/23: R RSA\par \par 7/5/23: Here for follow up. She is improving with PT.\par \par 6/7/23: Here for first PO visit. She is in sling. There is some numbness in her forearm. She removed the dressing herself.\par \par 5/3/23: Here for follow up. She states her right shoulder is very painful. No new injury but underwent radiation treatment and has pain with position of her arms. She takes tylenol, she tried to avoid NSAIDs.  She has tried activity modification and has tried a home exercise program of stretching over the past several months.  Pain with basic activities, such as bathing, getting dressed, etc, continues.  She had the opposite shoulder replaced, did PT and has also tried the exercises she previously did post operatively with minimal relief.  Lifting and using bands caused more pain. \par \par 3/10/21: Follow up left shoulder. She continues to improve but is stiff. She is benefitting from PT.\par \par 1/27/21: Follow up L shoulder. She has tightness. PT is helping. She is stiff.\par \par 12/16/20: Follow up left shoulder. She is going to PT and improving.\par \par 11/18/20: Here for first PO visit. She has some pain at night, trouble sleeping.\par \par 10/14/20: Follow up R shoulder. Here to review MRI. Today she reports the left is worse than the right side. \par \par MRI RIGHT SHOULDER: Grade 2 partial-thickness bursal sided tear in the anterior leading edge of the supraspinatus  tendon at the footprint spanning 7 mm in AP dimension superimposed on tendinosis. Grade 2 partial-thickness tearing in the superior subscapularis tendon superimposed on tendinosis. Infraspinatus tendinosis. Global degenerative tearing of the labrum. 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 Moderate AC joint arthrosis.\par \par 9/29/20: 81 y/o RHD female with R>L shoulder pain for several years. She has a h/o L RCR. She has had increasing pain over 6 months. She ahs pain at night. She has pain deep, radiating down to the elbow. She has paresthesias down the left arm. She takes Naproxen. She has not had any injections. \par \par PMHx: HTN, HLD, nonsmoker [] : This patient has had an injection before: no [FreeTextEntry1] : right shoulder [de-identified] : 5/22/23

## 2023-07-14 NOTE — PHYSICAL THERAPY INITIAL EVALUATION ADULT - WEIGHT-BEARING RESTRICTIONS: GAIT, REHAB EVAL
July 27, 2023      Charlie Perez  [address] [place] [address]          Dear Mr. Perez:    Joshua Vo MD has ordered a colonoscopy for you and we would like to schedule that procedure.     Please call  Shae RODRIGUES [phone] at your earliest convenience. Let the  know that your paperwork is started, and that you are calling to arrange a date and time for the procedure.      If you have any questions or concerns, we would be more than happy to discuss them with you. We look forward to assisting you with your health care needs.      Sincerely,   Shae RODRIGUES [phone]  Surgery Scheduler for Joshua Vo MD  [facility] Pre-Admit Department     [facility]

## 2023-08-16 ENCOUNTER — APPOINTMENT (OUTPATIENT)
Dept: ORTHOPEDIC SURGERY | Facility: CLINIC | Age: 83
End: 2023-08-16
Payer: MEDICARE

## 2023-08-16 VITALS — BODY MASS INDEX: 25.27 KG/M2 | WEIGHT: 161 LBS | HEIGHT: 67 IN

## 2023-08-16 PROCEDURE — 99024 POSTOP FOLLOW-UP VISIT: CPT

## 2023-08-16 PROCEDURE — 73010 X-RAY EXAM OF SHOULDER BLADE: CPT | Mod: RT

## 2023-08-16 PROCEDURE — 73030 X-RAY EXAM OF SHOULDER: CPT | Mod: RT

## 2023-08-16 NOTE — HISTORY OF PRESENT ILLNESS
[9] : 9 [7] : 7 [Dull/Aching] : dull/aching [Sharp] : sharp [Physical therapy] : physical therapy [de-identified] :  DOS 11/2/20: L RSA DOS 5/22/23: R RSA  8/16/23: Here for 3 month follow up R shoulder. She is in PT. She has some stiffness with reaching. There is tingling in her forearm.  7/5/23: Here for follow up. She is improving with PT.  6/7/23: Here for first PO visit. She is in sling. There is some numbness in her forearm. She removed the dressing herself.  5/3/23: Here for follow up. She states her right shoulder is very painful. No new injury but underwent radiation treatment and has pain with position of her arms. She takes tylenol, she tried to avoid NSAIDs.  She has tried activity modification and has tried a home exercise program of stretching over the past several months.  Pain with basic activities, such as bathing, getting dressed, etc, continues.  She had the opposite shoulder replaced, did PT and has also tried the exercises she previously did post operatively with minimal relief.  Lifting and using bands caused more pain.   3/10/21: Follow up left shoulder. She continues to improve but is stiff. She is benefitting from PT.  1/27/21: Follow up L shoulder. She has tightness. PT is helping. She is stiff.  12/16/20: Follow up left shoulder. She is going to PT and improving.  11/18/20: Here for first PO visit. She has some pain at night, trouble sleeping.  10/14/20: Follow up R shoulder. Here to review MRI. Today she reports the left is worse than the right side.   MRI RIGHT SHOULDER: Grade 2 partial-thickness bursal sided tear in the anterior leading edge of the supraspinatus  tendon at the footprint spanning 7 mm in AP dimension superimposed on tendinosis. Grade 2 partial-thickness tearing in the superior subscapularis tendon superimposed on tendinosis. Infraspinatus tendinosis. Global degenerative tearing of the labrum. 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 Moderate AC joint arthrosis.  9/29/20: 81 y/o RHD female with R>L shoulder pain for several years. She has a h/o L RCR. She has had increasing pain over 6 months. She ahs pain at night. She has pain deep, radiating down to the elbow. She has paresthesias down the left arm. She takes Naproxen. She has not had any injections.   PMHx: HTN, HLD, nonsmoker [] : This patient has had an injection before: no [FreeTextEntry1] : right shoulder [de-identified] : 07/05/23 [de-identified] : Dr. Esquivel [de-identified] : 05/22/23 [de-identified] : 5/22/23 [de-identified] : 08/15/23

## 2023-08-16 NOTE — ASSESSMENT
[FreeTextEntry1] : Bilateral shoulder DJD. h/o L SA, RCR.  Now s/p L RSA. Completed PT. HEP for stretching.  Now s/p R RSA PT for PROM, AROM as tolerated. OK to start strengthening. WB limit 10 lbs HEP for stretching. Will monitor parathesias. RTO 6 weeks to continue PT.

## 2023-08-22 ENCOUNTER — APPOINTMENT (OUTPATIENT)
Dept: PAIN MANAGEMENT | Facility: CLINIC | Age: 83
End: 2023-08-22
Payer: MEDICARE

## 2023-08-22 VITALS — WEIGHT: 161 LBS | HEIGHT: 67 IN | BODY MASS INDEX: 25.27 KG/M2

## 2023-08-22 DIAGNOSIS — M54.81 OCCIPITAL NEURALGIA: ICD-10-CM

## 2023-08-22 PROCEDURE — 64450 NJX AA&/STRD OTHER PN/BRANCH: CPT

## 2023-08-22 PROCEDURE — J3490M: CUSTOM

## 2023-08-22 PROCEDURE — 99213 OFFICE O/P EST LOW 20 MIN: CPT | Mod: 25

## 2023-08-22 NOTE — DISCUSSION/SUMMARY
[de-identified] : After discussing various treatment options with the patient including but not limited to oral medications, physical therapy, exercise modalities as well as interventional spinal injections, we have decided with the following plan:  - Continue home exercises, stretching, activity modification, physical therapy, and conservative care. - Follow-up as needed. - Will perform occipital nerve block today.

## 2023-08-22 NOTE — PHYSICAL EXAM
[Rotation to left] : rotation to left [Rotation to right] : rotation to right [de-identified] : Constitutional; Appears well, no apparent distress Ability to communicate: Normal  Respiratory: non-labored breathing Skin: No rash noted Head: Normocephalic, atraumatic Neck: no visible thyroid enlargement Eyes: Extraocular movements intact Neurologic: Alert and oriented x3 Psychiatric: normal mood, affect and behavior [] : no paracervical tenderness

## 2023-08-22 NOTE — PROCEDURE
[Bilateral] : bilaterally of the [Pain] : pain [Alcohol] : alcohol [___ cc    6mg] :  Betamethasone (Celestone) ~Vcc of 6mg [___ cc    0.25%] : Bupivacaine (Marcaine) ~Vcc of 0.25%  [Call if redness, pain or fever occur] : call if redness, pain or fever occur [Apply ice for 15min out of every hour for the next 12-24 hours as tolerated] : apply ice for 15 minutes out of every hour for the next 12-24 hours as tolerated [Risks, benefits, alternatives discussed / Verbal consent obtained] : the risks benefits, and alternatives have been discussed, and verbal consent was obtained [de-identified] : occipitakl nerve block

## 2023-08-22 NOTE — HISTORY OF PRESENT ILLNESS
[Neck] : neck [9] : 9 [Dull/Aching] : dull/aching [Radiating] : radiating [Sharp] : sharp [Constant] : constant [Injection therapy] : injection therapy [] : no [FreeTextEntry1] : center [FreeTextEntry7] : back and top of head  [FreeTextEntry9] : pressure

## 2023-08-31 DIAGNOSIS — M54.12 RADICULOPATHY, CERVICAL REGION: ICD-10-CM

## 2023-09-01 ENCOUNTER — RESULT REVIEW (OUTPATIENT)
Age: 83
End: 2023-09-01

## 2023-09-05 ENCOUNTER — APPOINTMENT (OUTPATIENT)
Dept: PAIN MANAGEMENT | Facility: CLINIC | Age: 83
End: 2023-09-05
Payer: MEDICARE

## 2023-09-05 VITALS — BODY MASS INDEX: 25.27 KG/M2 | WEIGHT: 161 LBS | HEIGHT: 67 IN

## 2023-09-05 DIAGNOSIS — M50.90 CERVICAL DISC DISORDER, UNSPECIFIED, UNSPECIFIED CERVICAL REGION: ICD-10-CM

## 2023-09-05 DIAGNOSIS — M54.2 CERVICALGIA: ICD-10-CM

## 2023-09-05 PROCEDURE — 99214 OFFICE O/P EST MOD 30 MIN: CPT

## 2023-09-05 NOTE — DISCUSSION/SUMMARY
[de-identified] : After discussing various treatment options with the patient including but not limited to oral medications, physical therapy, exercise modalities as well as interventional spinal injections, we have decided with the following plan:  - Continue Home exercises, stretching, activity modification, physical therapy, and conservative care. - MRI report and/or images was reviewed and discussed with the patient. - Recommend First Diagnostic LEFT C2,3,4,5 Medial Branch Blocks under fluoroscopic guidance with image. - Patient presents with axial lumbar pain that has not responded to 3 months of conservative therapy including physical therapy or NSAID therapy.  The pain is interfering with activities of daily living and functionality. There is no radicular pain. The pain is exacerbated by facet loading. The patient has not had a vertebral fusion at the levels of the proposed treatment.  There is no unexplained neurologic deficit.  There is no history of systemic infection, unstable medical condition, bleeding tendency, or local infection.  The injection is being performed to diagnose the facet joint as the source of the individual's pain, in preparation for a radiofrequency ablation.  - The risks, benefits, contents and alternatives to injection were explained in full to the patient.  Risks outlined include but are not limited to infection, sepsis, bleeding, post-dural puncture headache, nerve damage, temporary increase in pain, syncopal episode, failure to resolve symptoms, allergic reaction, symptom recurrence, and elevation of blood sugar in diabetics. Cortisone may cause immunosuppression.  Patient understands the risks.  All questions were answered.  After discussion of options, patient requested an injection.  Information regarding the injection was given to the patient.  Which medications to stop prior to the injection was explained to the patient as well. - Follow up in 1-2 weeks post injection for re-evaluation.

## 2023-09-05 NOTE — PHYSICAL EXAM
[Rotation to left] : rotation to left [Rotation to right] : rotation to right [de-identified] : Constitutional; Appears well, no apparent distress Ability to communicate: Normal  Respiratory: non-labored breathing Skin: No rash noted Head: Normocephalic, atraumatic Neck: no visible thyroid enlargement Eyes: Extraocular movements intact Neurologic: Alert and oriented x3 Psychiatric: normal mood, affect and behavior [] : no paracervical tenderness

## 2023-09-20 ENCOUNTER — APPOINTMENT (OUTPATIENT)
Age: 83
End: 2023-09-20

## 2023-09-25 NOTE — DISCHARGE NOTE PROVIDER - NSDCACTIVITY_GEN_ALL_CORE
Do not drive or operate machinery/Showering allowed/Stairs allowed/Walking - Indoors allowed/No heavy lifting/straining/Walking - Outdoors allowed/Follow Instructions Provided by your Surgical Team
FAMILY HISTORY:  Mother  Still living? Unknown  FH: dementia, Age at diagnosis: Age Unknown

## 2023-09-27 ENCOUNTER — APPOINTMENT (OUTPATIENT)
Dept: ORTHOPEDIC SURGERY | Facility: CLINIC | Age: 83
End: 2023-09-27
Payer: MEDICARE

## 2023-09-27 VITALS — WEIGHT: 161 LBS | HEIGHT: 67 IN | BODY MASS INDEX: 25.27 KG/M2

## 2023-09-27 VITALS — HEIGHT: 67 IN | BODY MASS INDEX: 25.27 KG/M2 | WEIGHT: 161 LBS

## 2023-09-27 DIAGNOSIS — Z98.890 OTHER SPECIFIED POSTPROCEDURAL STATES: ICD-10-CM

## 2023-09-27 DIAGNOSIS — M19.012 PRIMARY OSTEOARTHRITIS, LEFT SHOULDER: ICD-10-CM

## 2023-09-27 DIAGNOSIS — M19.011 PRIMARY OSTEOARTHRITIS, RIGHT SHOULDER: ICD-10-CM

## 2023-09-27 PROCEDURE — 99213 OFFICE O/P EST LOW 20 MIN: CPT

## 2023-10-03 ENCOUNTER — APPOINTMENT (OUTPATIENT)
Dept: PAIN MANAGEMENT | Facility: CLINIC | Age: 83
End: 2023-10-03

## 2023-10-23 NOTE — DISCHARGE NOTE PROVIDER - PROVIDER RX CONTACT NUMBER
[phone] Preparation Of Recipient Site - Flap: The eschar was removed surgically with sharp dissection to facilitate appropriate wound healing of the following adjacent tissue rearrangement.

## 2023-12-28 VITALS — BODY MASS INDEX: 25.27 KG/M2 | WEIGHT: 161 LBS | HEIGHT: 67 IN

## 2024-01-02 NOTE — PATIENT PROFILE ADULT - FUNCTIONAL ASSESSMENT - BASIC MOBILITY SECTION LABEL
on call at [phone].    
.

## 2024-01-05 ENCOUNTER — APPOINTMENT (OUTPATIENT)
Dept: ORTHOPEDIC SURGERY | Facility: CLINIC | Age: 84
End: 2024-01-05
Payer: MEDICARE

## 2024-01-05 VITALS — HEIGHT: 67 IN | BODY MASS INDEX: 25.27 KG/M2 | WEIGHT: 161 LBS

## 2024-01-05 DIAGNOSIS — Z96.611 PRESENCE OF RIGHT ARTIFICIAL SHOULDER JOINT: ICD-10-CM

## 2024-01-05 PROCEDURE — 73010 X-RAY EXAM OF SHOULDER BLADE: CPT | Mod: RT

## 2024-01-05 PROCEDURE — 99213 OFFICE O/P EST LOW 20 MIN: CPT

## 2024-01-05 PROCEDURE — 73030 X-RAY EXAM OF SHOULDER: CPT | Mod: RT

## 2024-01-05 NOTE — HISTORY OF PRESENT ILLNESS
[Dull/Aching] : dull/aching [Sharp] : sharp [Rest] : rest [3] : 3 [2] : 2 [Frequent] : frequent [Meds] : meds [de-identified] :  DOS 11/2/20: L RSA DOS 5/22/23: R RSA  1/5/24: Here to follow up on right shoulder, about 8 months. She states PT is no longer approved. She does HEP.  9/27/23: Here for follow up, now over 4 months post op.  She is in PT and improving. There is stiffness reaching overhead and behind her back.  8/16/23: Here for 3 month follow up R shoulder. She is in PT. She has some stiffness with reaching. There is tingling in her forearm.  7/5/23: Here for follow up. She is improving with PT.  6/7/23: Here for first PO visit. She is in sling. There is some numbness in her forearm. She removed the dressing herself.  5/3/23: Here for follow up. She states her right shoulder is very painful. No new injury but underwent radiation treatment and has pain with position of her arms. She takes tylenol, she tried to avoid NSAIDs.  She has tried activity modification and has tried a home exercise program of stretching over the past several months.  Pain with basic activities, such as bathing, getting dressed, etc, continues.  She had the opposite shoulder replaced, did PT and has also tried the exercises she previously did post operatively with minimal relief.  Lifting and using bands caused more pain.   3/10/21: Follow up left shoulder. She continues to improve but is stiff. She is benefitting from PT.  1/27/21: Follow up L shoulder. She has tightness. PT is helping. She is stiff.  12/16/20: Follow up left shoulder. She is going to PT and improving.  11/18/20: Here for first PO visit. She has some pain at night, trouble sleeping.  10/14/20: Follow up R shoulder. Here to review MRI. Today she reports the left is worse than the right side.   MRI RIGHT SHOULDER: Grade 2 partial-thickness bursal sided tear in the anterior leading edge of the supraspinatus  tendon at the footprint spanning 7 mm in AP dimension superimposed on tendinosis. Grade 2 partial-thickness tearing in the superior subscapularis tendon superimposed on tendinosis. Infraspinatus tendinosis. Global degenerative tearing of the labrum. 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 Moderate AC joint arthrosis.  9/29/20: 81 y/o RHD female with R>L shoulder pain for several years. She has a h/o L RCR. She has had increasing pain over 6 months. She ahs pain at night. She has pain deep, radiating down to the elbow. She has paresthesias down the left arm. She takes Naproxen. She has not had any injections.   PMHx: HTN, HLD, nonsmoker [] : This patient has had an injection before: no [FreeTextEntry1] : right shoulder [de-identified] : 07/05/23 [de-identified] : Dr. Esquivel [de-identified] : 05/22/23 [de-identified] : 08/15/23 [de-identified] : 5/22/23

## 2024-01-05 NOTE — ASSESSMENT
[FreeTextEntry1] : Bilateral shoulder DJD. h/o L SA, RCR. Now s/p L RSA. Completed PT. HEP for stretching.  Now s/p R RSA Completed PT. HEP for stretching. Will monitor parathesias. RTO May to repeat xrays bilateral shoulders.

## 2024-01-23 NOTE — ASU PREOP CHECKLIST - WEIGHT IN LBS
I personally called and advised the patient to hold Pradaxa today and continue holding up to her surgery this Friday, 1/26. I have ordered her Lovenox injections to be taken for three days. 156.5

## 2024-05-03 ENCOUNTER — APPOINTMENT (OUTPATIENT)
Dept: ORTHOPEDIC SURGERY | Facility: CLINIC | Age: 84
End: 2024-05-03

## 2024-09-09 NOTE — ASU PREOP CHECKLIST - BP NONINVASIVE SYSTOLIC (MM HG)
Rx Refill Note  Requested Prescriptions     Pending Prescriptions Disp Refills    Ozempic, 1 MG/DOSE, 4 MG/3ML solution pen-injector [Pharmacy Med Name: OZEMPIC 4MG/3ML SOLN PEN-INJ]  2     Sig: INJECT ONE (1) MG UNDER THE SKIN INTO THE APPROPRIATE AREA AS DIRECTED ONE (1) (ONE) TIME PER WEEK.      Last office visit with prescribing clinician: Visit date not found   Last telemedicine visit with prescribing clinician: Visit date not found   Next office visit with prescribing clinician: 9/10/2024                         Would you like a call back once the refill request has been completed: [] Yes [] No    If the office needs to give you a call back, can they leave a voicemail: [] Yes [] No    Zacarias Lyman MA  09/09/24, 10:08 CDT  
Will address at 9/10 appt with Lina in the event of possible increase.   
145

## 2025-02-16 NOTE — BRIEF OPERATIVE NOTE - NSICDXBRIEFPREOP_GEN_ALL_CORE_FT
PRE-OP DIAGNOSIS:  Primary osteoarthritis of right hip 11-Nov-2019 14:19:36  Julio Cesar Sahni Pt will meet >75% estimated nutrient needs as tolerated.

## 2025-08-01 NOTE — PATIENT PROFILE ADULT - FOOD INSECURITY
Child accompanied by mother and sister  DIET: good variety of foods       Appetite - good       Milk/dairy products - skim;cheese,yogurt  SLEEPING:      Night - 10 hours  STOOLS: normal  SCHOOL HISTORY:       School - [employer]       Grade - 2       Attendance - normal       Academic performance - normal       Extracurricular Activities - YES  CHILDCARE: none  VARICELLA STATUS: immune by documented two doses of vaccine  HEARING: grossly normal  MEDICATIONS: Patient is currently taking any medication-see med tab  CONCERNS: none. Denies known Latex allergy or symptoms of Latex sensitivity.Visual acuity: right 20/25, left 20/25. Colors-normal.     Health Maintenance       COVID-19 Vaccine (1 - Pediatric 2024-25 season)  Never done    Well Child Visit (ages 3 - 21) (Yearly)  Scheduled for 8/1/2025           Following review of the above:  Health maintenance topics up to date.    Note: Refer to final orders and clinician documentation.          no

## (undated) DEVICE — SUT VICRYL 0 27" CT-1 UNDYED

## (undated) DEVICE — DRAPE IOBAN 33" X 23"

## (undated) DEVICE — LAP PAD 18 X 18"

## (undated) DEVICE — DRSG MCCONNELL ARM WRAP LG

## (undated) DEVICE — DRAPE INSTRUMENT POUCH 6.75" X 11"

## (undated) DEVICE — SUT ORTHOCORD 2 36"

## (undated) DEVICE — DRAPE U 47X51" LF STERILE

## (undated) DEVICE — FRAZIER SUCTION TIP 12FR

## (undated) DEVICE — DRSG COBAN 6"

## (undated) DEVICE — DRAPE TOWEL BLUE 17" X 24"

## (undated) DEVICE — ZIMMER PULSAVAC PLUS FAN KIT

## (undated) DEVICE — BLADE SURGICAL #15 CARBON

## (undated) DEVICE — VISITEC 4X4

## (undated) DEVICE — MIXER BONE CEMENT EVAC III

## (undated) DEVICE — BLADE SURGICAL #10 CARBON

## (undated) DEVICE — SUT STRATAFIX SPIRAL MONOCRYL PLUS 4-0 45CM PS-2 UNDYED

## (undated) DEVICE — DRAPE MAGNETIC INSTRUMENT MEDIUM

## (undated) DEVICE — GOWN SMARTGOWN RAGLAN XLG

## (undated) DEVICE — DRSG DERMABOND PRINEO 22CM

## (undated) DEVICE — SUT NDL MAYO CATGUT 1/2 CIRCLE TAPER POINT 0.056" X 1.950"

## (undated) DEVICE — SUT ETHIBOND 2 30" V37

## (undated) DEVICE — HOOD T5 PEELAWAY

## (undated) DEVICE — VENODYNE/SCD SLEEVE CALF MEDIUM

## (undated) DEVICE — GLV 8 PROTEXIS (WHITE)

## (undated) DEVICE — WARMING BLANKET LOWER ADULT

## (undated) DEVICE — PACK ORTHO

## (undated) DEVICE — SUT VICRYL 2-0 27" CT-2 UNDYED

## (undated) DEVICE — DRAPE SPLIT SHEET 77" X 120"

## (undated) DEVICE — DRAPE 3/4 SHEET W REINFORCEMENT 56X77"

## (undated) DEVICE — DRAPE SURGICAL #1010

## (undated) DEVICE — PACK BASIC

## (undated) DEVICE — FRA-ESU BOVIE FORCE TRIAD T7J19717DX: Type: DURABLE MEDICAL EQUIPMENT

## (undated) DEVICE — SOL IRR BAG NS 0.9% 3000ML

## (undated) DEVICE — GLV 8 PROTEXIS (BLUE)

## (undated) DEVICE — SAW BLADE STRYKER SAGITTAL 24.7X0.89X73.7MM